# Patient Record
Sex: MALE | Race: WHITE | NOT HISPANIC OR LATINO | Employment: UNEMPLOYED | URBAN - METROPOLITAN AREA
[De-identification: names, ages, dates, MRNs, and addresses within clinical notes are randomized per-mention and may not be internally consistent; named-entity substitution may affect disease eponyms.]

---

## 2018-01-01 ENCOUNTER — APPOINTMENT (OUTPATIENT)
Dept: RADIOLOGY | Facility: MEDICAL CENTER | Age: 48
DRG: 288 | End: 2018-01-01
Attending: EMERGENCY MEDICINE

## 2018-01-01 ENCOUNTER — APPOINTMENT (OUTPATIENT)
Dept: CARDIOLOGY | Facility: MEDICAL CENTER | Age: 48
DRG: 288 | End: 2018-01-01
Attending: HOSPITALIST

## 2018-01-01 ENCOUNTER — HOSPITAL ENCOUNTER (INPATIENT)
Facility: MEDICAL CENTER | Age: 48
LOS: 6 days | DRG: 288 | End: 2018-12-17
Attending: EMERGENCY MEDICINE | Admitting: INTERNAL MEDICINE

## 2018-01-01 ENCOUNTER — APPOINTMENT (OUTPATIENT)
Dept: RADIOLOGY | Facility: MEDICAL CENTER | Age: 48
DRG: 288 | End: 2018-01-01
Attending: HOSPITALIST

## 2018-01-01 VITALS
SYSTOLIC BLOOD PRESSURE: 159 MMHG | TEMPERATURE: 98.7 F | BODY MASS INDEX: 29.48 KG/M2 | DIASTOLIC BLOOD PRESSURE: 87 MMHG | RESPIRATION RATE: 18 BRPM | OXYGEN SATURATION: 95 % | WEIGHT: 183.42 LBS | HEIGHT: 66 IN | HEART RATE: 106 BPM

## 2018-01-01 DIAGNOSIS — M54.42 ACUTE LEFT-SIDED LOW BACK PAIN WITH LEFT-SIDED SCIATICA: ICD-10-CM

## 2018-01-01 DIAGNOSIS — I95.9 HYPOTENSION, UNSPECIFIED HYPOTENSION TYPE: ICD-10-CM

## 2018-01-01 LAB
ALBUMIN SERPL BCP-MCNC: 3.1 G/DL (ref 3.2–4.9)
ALBUMIN SERPL BCP-MCNC: 3.4 G/DL (ref 3.2–4.9)
ALBUMIN SERPL BCP-MCNC: 3.5 G/DL (ref 3.2–4.9)
ALBUMIN/GLOB SERPL: 1.3 G/DL
ALBUMIN/GLOB SERPL: 1.4 G/DL
ALBUMIN/GLOB SERPL: 1.5 G/DL
ALP SERPL-CCNC: 55 U/L (ref 30–99)
ALP SERPL-CCNC: 58 U/L (ref 30–99)
ALP SERPL-CCNC: 59 U/L (ref 30–99)
ALT SERPL-CCNC: 32 U/L (ref 2–50)
ALT SERPL-CCNC: 39 U/L (ref 2–50)
ALT SERPL-CCNC: 53 U/L (ref 2–50)
AMPHET UR QL SCN: POSITIVE
ANION GAP SERPL CALC-SCNC: 10 MMOL/L (ref 0–11.9)
ANION GAP SERPL CALC-SCNC: 11 MMOL/L (ref 0–11.9)
ANION GAP SERPL CALC-SCNC: 7 MMOL/L (ref 0–11.9)
ANION GAP SERPL CALC-SCNC: 8 MMOL/L (ref 0–11.9)
ANION GAP SERPL CALC-SCNC: 9 MMOL/L (ref 0–11.9)
APPEARANCE UR: CLEAR
APPEARANCE UR: CLEAR
APTT PPP: 29.8 SEC (ref 24.7–36)
AST SERPL-CCNC: 17 U/L (ref 12–45)
AST SERPL-CCNC: 34 U/L (ref 12–45)
AST SERPL-CCNC: 39 U/L (ref 12–45)
BACTERIA BLD CULT: ABNORMAL
BACTERIA BLD CULT: ABNORMAL
BACTERIA BLD CULT: NORMAL
BARBITURATES UR QL SCN: NEGATIVE
BASOPHILS # BLD AUTO: 0 % (ref 0–1.8)
BASOPHILS # BLD AUTO: 0.4 % (ref 0–1.8)
BASOPHILS # BLD AUTO: 0.5 % (ref 0–1.8)
BASOPHILS # BLD AUTO: 0.5 % (ref 0–1.8)
BASOPHILS # BLD AUTO: 0.7 % (ref 0–1.8)
BASOPHILS # BLD AUTO: 0.8 % (ref 0–1.8)
BASOPHILS # BLD: 0 K/UL (ref 0–0.12)
BASOPHILS # BLD: 0.08 K/UL (ref 0–0.12)
BASOPHILS # BLD: 0.09 K/UL (ref 0–0.12)
BASOPHILS # BLD: 0.09 K/UL (ref 0–0.12)
BASOPHILS # BLD: 0.1 K/UL (ref 0–0.12)
BASOPHILS # BLD: 0.12 K/UL (ref 0–0.12)
BENZODIAZ UR QL SCN: NEGATIVE
BILIRUB SERPL-MCNC: 0.4 MG/DL (ref 0.1–1.5)
BILIRUB SERPL-MCNC: 0.9 MG/DL (ref 0.1–1.5)
BILIRUB SERPL-MCNC: 1 MG/DL (ref 0.1–1.5)
BILIRUB UR QL STRIP.AUTO: NEGATIVE
BILIRUB UR QL STRIP.AUTO: NEGATIVE
BUN SERPL-MCNC: 11 MG/DL (ref 8–22)
BUN SERPL-MCNC: 15 MG/DL (ref 8–22)
BUN SERPL-MCNC: 18 MG/DL (ref 8–22)
BUN SERPL-MCNC: 22 MG/DL (ref 8–22)
BUN SERPL-MCNC: 26 MG/DL (ref 8–22)
BUN SERPL-MCNC: 8 MG/DL (ref 8–22)
BUN SERPL-MCNC: 9 MG/DL (ref 8–22)
BZE UR QL SCN: NEGATIVE
CALCIUM SERPL-MCNC: 7.9 MG/DL (ref 8.5–10.5)
CALCIUM SERPL-MCNC: 8.1 MG/DL (ref 8.5–10.5)
CALCIUM SERPL-MCNC: 8.4 MG/DL (ref 8.5–10.5)
CALCIUM SERPL-MCNC: 8.4 MG/DL (ref 8.5–10.5)
CALCIUM SERPL-MCNC: 8.5 MG/DL (ref 8.5–10.5)
CALCIUM SERPL-MCNC: 8.6 MG/DL (ref 8.5–10.5)
CALCIUM SERPL-MCNC: 8.6 MG/DL (ref 8.5–10.5)
CANNABINOIDS UR QL SCN: POSITIVE
CHLORIDE SERPL-SCNC: 103 MMOL/L (ref 96–112)
CHLORIDE SERPL-SCNC: 105 MMOL/L (ref 96–112)
CHLORIDE SERPL-SCNC: 105 MMOL/L (ref 96–112)
CHLORIDE SERPL-SCNC: 106 MMOL/L (ref 96–112)
CHLORIDE SERPL-SCNC: 109 MMOL/L (ref 96–112)
CHLORIDE SERPL-SCNC: 110 MMOL/L (ref 96–112)
CHLORIDE SERPL-SCNC: 110 MMOL/L (ref 96–112)
CO2 SERPL-SCNC: 18 MMOL/L (ref 20–33)
CO2 SERPL-SCNC: 19 MMOL/L (ref 20–33)
CO2 SERPL-SCNC: 19 MMOL/L (ref 20–33)
CO2 SERPL-SCNC: 20 MMOL/L (ref 20–33)
CO2 SERPL-SCNC: 21 MMOL/L (ref 20–33)
CO2 SERPL-SCNC: 22 MMOL/L (ref 20–33)
CO2 SERPL-SCNC: 22 MMOL/L (ref 20–33)
COLOR UR: YELLOW
COLOR UR: YELLOW
CREAT SERPL-MCNC: 0.72 MG/DL (ref 0.5–1.4)
CREAT SERPL-MCNC: 0.73 MG/DL (ref 0.5–1.4)
CREAT SERPL-MCNC: 0.74 MG/DL (ref 0.5–1.4)
CREAT SERPL-MCNC: 0.77 MG/DL (ref 0.5–1.4)
CREAT SERPL-MCNC: 0.81 MG/DL (ref 0.5–1.4)
CREAT SERPL-MCNC: 1.21 MG/DL (ref 0.5–1.4)
CREAT SERPL-MCNC: 1.44 MG/DL (ref 0.5–1.4)
CRP SERPL HS-MCNC: 0.27 MG/DL (ref 0–0.75)
EKG IMPRESSION: NORMAL
EOSINOPHIL # BLD AUTO: 0 K/UL (ref 0–0.51)
EOSINOPHIL # BLD AUTO: 0.07 K/UL (ref 0–0.51)
EOSINOPHIL # BLD AUTO: 0.26 K/UL (ref 0–0.51)
EOSINOPHIL # BLD AUTO: 0.37 K/UL (ref 0–0.51)
EOSINOPHIL # BLD AUTO: 0.5 K/UL (ref 0–0.51)
EOSINOPHIL # BLD AUTO: 0.72 K/UL (ref 0–0.51)
EOSINOPHIL NFR BLD: 0 % (ref 0–6.9)
EOSINOPHIL NFR BLD: 0.4 % (ref 0–6.9)
EOSINOPHIL NFR BLD: 1.4 % (ref 0–6.9)
EOSINOPHIL NFR BLD: 2.6 % (ref 0–6.9)
EOSINOPHIL NFR BLD: 2.8 % (ref 0–6.9)
EOSINOPHIL NFR BLD: 4.1 % (ref 0–6.9)
ERYTHROCYTE [DISTWIDTH] IN BLOOD BY AUTOMATED COUNT: 41.4 FL (ref 35.9–50)
ERYTHROCYTE [DISTWIDTH] IN BLOOD BY AUTOMATED COUNT: 42.4 FL (ref 35.9–50)
ERYTHROCYTE [DISTWIDTH] IN BLOOD BY AUTOMATED COUNT: 42.6 FL (ref 35.9–50)
ERYTHROCYTE [DISTWIDTH] IN BLOOD BY AUTOMATED COUNT: 42.9 FL (ref 35.9–50)
ERYTHROCYTE [DISTWIDTH] IN BLOOD BY AUTOMATED COUNT: 43.4 FL (ref 35.9–50)
ERYTHROCYTE [DISTWIDTH] IN BLOOD BY AUTOMATED COUNT: 43.6 FL (ref 35.9–50)
ERYTHROCYTE [DISTWIDTH] IN BLOOD BY AUTOMATED COUNT: 45.9 FL (ref 35.9–50)
ERYTHROCYTE [SEDIMENTATION RATE] IN BLOOD BY WESTERGREN METHOD: 6 MM/HOUR (ref 0–15)
GIANT PLATELETS BLD QL SMEAR: NORMAL
GLOBULIN SER CALC-MCNC: 2.3 G/DL (ref 1.9–3.5)
GLOBULIN SER CALC-MCNC: 2.3 G/DL (ref 1.9–3.5)
GLOBULIN SER CALC-MCNC: 2.5 G/DL (ref 1.9–3.5)
GLUCOSE BLD-MCNC: 83 MG/DL (ref 65–99)
GLUCOSE SERPL-MCNC: 103 MG/DL (ref 65–99)
GLUCOSE SERPL-MCNC: 104 MG/DL (ref 65–99)
GLUCOSE SERPL-MCNC: 110 MG/DL (ref 65–99)
GLUCOSE SERPL-MCNC: 124 MG/DL (ref 65–99)
GLUCOSE SERPL-MCNC: 134 MG/DL (ref 65–99)
GLUCOSE SERPL-MCNC: 155 MG/DL (ref 65–99)
GLUCOSE SERPL-MCNC: 97 MG/DL (ref 65–99)
GLUCOSE UR STRIP.AUTO-MCNC: NEGATIVE MG/DL
GLUCOSE UR STRIP.AUTO-MCNC: NEGATIVE MG/DL
HAV IGM SERPL QL IA: NEGATIVE
HBV CORE IGM SER QL: NEGATIVE
HBV SURFACE AG SER QL: NEGATIVE
HCT VFR BLD AUTO: 37.3 % (ref 42–52)
HCT VFR BLD AUTO: 38.9 % (ref 42–52)
HCT VFR BLD AUTO: 40 % (ref 42–52)
HCT VFR BLD AUTO: 40.9 % (ref 42–52)
HCT VFR BLD AUTO: 41 % (ref 42–52)
HCT VFR BLD AUTO: 41.6 % (ref 42–52)
HCT VFR BLD AUTO: 41.8 % (ref 42–52)
HCT VFR BLD AUTO: 42.5 % (ref 42–52)
HCV AB SER QL: NEGATIVE
HGB BLD-MCNC: 12.1 G/DL (ref 14–18)
HGB BLD-MCNC: 13.1 G/DL (ref 14–18)
HGB BLD-MCNC: 13.9 G/DL (ref 14–18)
HGB BLD-MCNC: 14 G/DL (ref 14–18)
HGB BLD-MCNC: 14 G/DL (ref 14–18)
HGB BLD-MCNC: 14.1 G/DL (ref 14–18)
HGB BLD-MCNC: 14.2 G/DL (ref 14–18)
HGB BLD-MCNC: 14.3 G/DL (ref 14–18)
HIV 1+2 AB+HIV1 P24 AG SERPL QL IA: NON REACTIVE
IMM GRANULOCYTES # BLD AUTO: 0.1 K/UL (ref 0–0.11)
IMM GRANULOCYTES # BLD AUTO: 0.2 K/UL (ref 0–0.11)
IMM GRANULOCYTES # BLD AUTO: 0.2 K/UL (ref 0–0.11)
IMM GRANULOCYTES # BLD AUTO: 0.23 K/UL (ref 0–0.11)
IMM GRANULOCYTES # BLD AUTO: 0.25 K/UL (ref 0–0.11)
IMM GRANULOCYTES NFR BLD AUTO: 0.8 % (ref 0–0.9)
IMM GRANULOCYTES NFR BLD AUTO: 1 % (ref 0–0.9)
IMM GRANULOCYTES NFR BLD AUTO: 1 % (ref 0–0.9)
IMM GRANULOCYTES NFR BLD AUTO: 1.2 % (ref 0–0.9)
IMM GRANULOCYTES NFR BLD AUTO: 1.4 % (ref 0–0.9)
INR PPP: 1.07 (ref 0.87–1.13)
KETONES UR STRIP.AUTO-MCNC: 40 MG/DL
KETONES UR STRIP.AUTO-MCNC: NEGATIVE MG/DL
LACTATE BLD-SCNC: 1.7 MMOL/L (ref 0.5–2)
LEUKOCYTE ESTERASE UR QL STRIP.AUTO: NEGATIVE
LEUKOCYTE ESTERASE UR QL STRIP.AUTO: NEGATIVE
LG PLATELETS BLD QL SMEAR: NORMAL
LIPASE SERPL-CCNC: 12 U/L (ref 11–82)
LV EJECT FRACT  99904: 65
LV EJECT FRACT MOD 2C 99903: 71.92
LV EJECT FRACT MOD 4C 99902: 70.61
LV EJECT FRACT MOD BP 99901: 69.53
LYMPHOCYTES # BLD AUTO: 1.76 K/UL (ref 1–4.8)
LYMPHOCYTES # BLD AUTO: 1.8 K/UL (ref 1–4.8)
LYMPHOCYTES # BLD AUTO: 2 K/UL (ref 1–4.8)
LYMPHOCYTES # BLD AUTO: 2.18 K/UL (ref 1–4.8)
LYMPHOCYTES # BLD AUTO: 2.48 K/UL (ref 1–4.8)
LYMPHOCYTES # BLD AUTO: 3.29 K/UL (ref 1–4.8)
LYMPHOCYTES NFR BLD: 10.5 % (ref 22–41)
LYMPHOCYTES NFR BLD: 11 % (ref 22–41)
LYMPHOCYTES NFR BLD: 12.5 % (ref 22–41)
LYMPHOCYTES NFR BLD: 25 % (ref 22–41)
LYMPHOCYTES NFR BLD: 9.2 % (ref 22–41)
LYMPHOCYTES NFR BLD: 9.4 % (ref 22–41)
MAGNESIUM SERPL-MCNC: 2 MG/DL (ref 1.5–2.5)
MANUAL DIFF BLD: NORMAL
MCH RBC QN AUTO: 28.9 PG (ref 27–33)
MCH RBC QN AUTO: 29.1 PG (ref 27–33)
MCH RBC QN AUTO: 29.2 PG (ref 27–33)
MCH RBC QN AUTO: 29.4 PG (ref 27–33)
MCH RBC QN AUTO: 29.5 PG (ref 27–33)
MCH RBC QN AUTO: 29.7 PG (ref 27–33)
MCH RBC QN AUTO: 29.7 PG (ref 27–33)
MCHC RBC AUTO-ENTMCNC: 32.4 G/DL (ref 33.7–35.3)
MCHC RBC AUTO-ENTMCNC: 33.6 G/DL (ref 33.7–35.3)
MCHC RBC AUTO-ENTMCNC: 33.7 G/DL (ref 33.7–35.3)
MCHC RBC AUTO-ENTMCNC: 33.7 G/DL (ref 33.7–35.3)
MCHC RBC AUTO-ENTMCNC: 34 G/DL (ref 33.7–35.3)
MCHC RBC AUTO-ENTMCNC: 34.1 G/DL (ref 33.7–35.3)
MCHC RBC AUTO-ENTMCNC: 35.3 G/DL (ref 33.7–35.3)
MCV RBC AUTO: 84.4 FL (ref 81.4–97.8)
MCV RBC AUTO: 85.6 FL (ref 81.4–97.8)
MCV RBC AUTO: 85.7 FL (ref 81.4–97.8)
MCV RBC AUTO: 85.9 FL (ref 81.4–97.8)
MCV RBC AUTO: 87.4 FL (ref 81.4–97.8)
MCV RBC AUTO: 88.1 FL (ref 81.4–97.8)
MCV RBC AUTO: 91 FL (ref 81.4–97.8)
METHADONE UR QL SCN: NEGATIVE
MICRO URNS: ABNORMAL
MICRO URNS: NORMAL
MONOCYTES # BLD AUTO: 1.12 K/UL (ref 0–0.85)
MONOCYTES # BLD AUTO: 1.98 K/UL (ref 0–0.85)
MONOCYTES # BLD AUTO: 2 K/UL (ref 0–0.85)
MONOCYTES # BLD AUTO: 2.01 K/UL (ref 0–0.85)
MONOCYTES # BLD AUTO: 2.04 K/UL (ref 0–0.85)
MONOCYTES # BLD AUTO: 2.25 K/UL (ref 0–0.85)
MONOCYTES NFR BLD AUTO: 10 % (ref 0–13.4)
MONOCYTES NFR BLD AUTO: 10.4 % (ref 0–13.4)
MONOCYTES NFR BLD AUTO: 10.4 % (ref 0–13.4)
MONOCYTES NFR BLD AUTO: 10.5 % (ref 0–13.4)
MONOCYTES NFR BLD AUTO: 11.7 % (ref 0–13.4)
MONOCYTES NFR BLD AUTO: 8.5 % (ref 0–13.4)
MORPHOLOGY BLD-IMP: NORMAL
NEUTROPHILS # BLD AUTO: 12.08 K/UL (ref 1.82–7.42)
NEUTROPHILS # BLD AUTO: 14.59 K/UL (ref 1.82–7.42)
NEUTROPHILS # BLD AUTO: 14.67 K/UL (ref 1.82–7.42)
NEUTROPHILS # BLD AUTO: 14.77 K/UL (ref 1.82–7.42)
NEUTROPHILS # BLD AUTO: 17.78 K/UL (ref 1.82–7.42)
NEUTROPHILS # BLD AUTO: 8.19 K/UL (ref 1.82–7.42)
NEUTROPHILS NFR BLD: 62.1 % (ref 44–72)
NEUTROPHILS NFR BLD: 69.6 % (ref 44–72)
NEUTROPHILS NFR BLD: 74 % (ref 44–72)
NEUTROPHILS NFR BLD: 76.3 % (ref 44–72)
NEUTROPHILS NFR BLD: 77.1 % (ref 44–72)
NEUTROPHILS NFR BLD: 77.2 % (ref 44–72)
NEUTS BAND NFR BLD MANUAL: 5 % (ref 0–10)
NITRITE UR QL STRIP.AUTO: NEGATIVE
NITRITE UR QL STRIP.AUTO: NEGATIVE
NRBC # BLD AUTO: 0 K/UL
NRBC BLD-RTO: 0 /100 WBC
OPIATES UR QL SCN: POSITIVE
OXYCODONE UR QL SCN: NEGATIVE
PCP UR QL SCN: NEGATIVE
PH UR STRIP.AUTO: 5.5 [PH]
PH UR STRIP.AUTO: 6.5 [PH]
PLATELET # BLD AUTO: 175 K/UL (ref 164–446)
PLATELET # BLD AUTO: 177 K/UL (ref 164–446)
PLATELET # BLD AUTO: 196 K/UL (ref 164–446)
PLATELET # BLD AUTO: 233 K/UL (ref 164–446)
PLATELET # BLD AUTO: 244 K/UL (ref 164–446)
PLATELET # BLD AUTO: 319 K/UL (ref 164–446)
PLATELET # BLD AUTO: 364 K/UL (ref 164–446)
PLATELET BLD QL SMEAR: NORMAL
PMV BLD AUTO: 10.5 FL (ref 9–12.9)
PMV BLD AUTO: 9.4 FL (ref 9–12.9)
PMV BLD AUTO: 9.4 FL (ref 9–12.9)
PMV BLD AUTO: 9.5 FL (ref 9–12.9)
PMV BLD AUTO: 9.8 FL (ref 9–12.9)
PMV BLD AUTO: 9.8 FL (ref 9–12.9)
PMV BLD AUTO: 9.9 FL (ref 9–12.9)
POTASSIUM SERPL-SCNC: 3.2 MMOL/L (ref 3.6–5.5)
POTASSIUM SERPL-SCNC: 3.3 MMOL/L (ref 3.6–5.5)
POTASSIUM SERPL-SCNC: 3.4 MMOL/L (ref 3.6–5.5)
POTASSIUM SERPL-SCNC: 3.5 MMOL/L (ref 3.6–5.5)
POTASSIUM SERPL-SCNC: 3.7 MMOL/L (ref 3.6–5.5)
POTASSIUM SERPL-SCNC: 3.8 MMOL/L (ref 3.6–5.5)
POTASSIUM SERPL-SCNC: 4 MMOL/L (ref 3.6–5.5)
PROCALCITONIN SERPL-MCNC: 0.14 NG/ML
PROPOXYPH UR QL SCN: NEGATIVE
PROT SERPL-MCNC: 5.4 G/DL (ref 6–8.2)
PROT SERPL-MCNC: 5.8 G/DL (ref 6–8.2)
PROT SERPL-MCNC: 5.9 G/DL (ref 6–8.2)
PROT UR QL STRIP: NEGATIVE MG/DL
PROT UR QL STRIP: NEGATIVE MG/DL
PROTHROMBIN TIME: 14 SEC (ref 12–14.6)
RBC # BLD AUTO: 4.1 M/UL (ref 4.7–6.1)
RBC # BLD AUTO: 4.45 M/UL (ref 4.7–6.1)
RBC # BLD AUTO: 4.72 M/UL (ref 4.7–6.1)
RBC # BLD AUTO: 4.74 M/UL (ref 4.7–6.1)
RBC # BLD AUTO: 4.79 M/UL (ref 4.7–6.1)
RBC # BLD AUTO: 4.88 M/UL (ref 4.7–6.1)
RBC # BLD AUTO: 4.95 M/UL (ref 4.7–6.1)
RBC BLD AUTO: NORMAL
RBC UR QL AUTO: NEGATIVE
RBC UR QL AUTO: NEGATIVE
SIGNIFICANT IND 70042: ABNORMAL
SIGNIFICANT IND 70042: NORMAL
SITE SITE: ABNORMAL
SITE SITE: NORMAL
SODIUM SERPL-SCNC: 134 MMOL/L (ref 135–145)
SODIUM SERPL-SCNC: 135 MMOL/L (ref 135–145)
SODIUM SERPL-SCNC: 136 MMOL/L (ref 135–145)
SODIUM SERPL-SCNC: 137 MMOL/L (ref 135–145)
SODIUM SERPL-SCNC: 140 MMOL/L (ref 135–145)
SOURCE SOURCE: ABNORMAL
SOURCE SOURCE: NORMAL
SP GR UR STRIP.AUTO: 1.01
SP GR UR STRIP.AUTO: >=1.03
TROPONIN I SERPL-MCNC: 0.33 NG/ML (ref 0–0.04)
TROPONIN I SERPL-MCNC: <0.01 NG/ML (ref 0–0.04)
UROBILINOGEN UR STRIP.AUTO-MCNC: 0.2 MG/DL
UROBILINOGEN UR STRIP.AUTO-MCNC: 0.2 MG/DL
VARIANT LYMPHS BLD QL SMEAR: NORMAL
WBC # BLD AUTO: 13.2 K/UL (ref 4.8–10.8)
WBC # BLD AUTO: 17.4 K/UL (ref 4.8–10.8)
WBC # BLD AUTO: 18.1 K/UL (ref 4.8–10.8)
WBC # BLD AUTO: 19 K/UL (ref 4.8–10.8)
WBC # BLD AUTO: 19.1 K/UL (ref 4.8–10.8)
WBC # BLD AUTO: 19.1 K/UL (ref 4.8–10.8)
WBC # BLD AUTO: 22.5 K/UL (ref 4.8–10.8)

## 2018-01-01 PROCEDURE — A9270 NON-COVERED ITEM OR SERVICE: HCPCS | Performed by: INTERNAL MEDICINE

## 2018-01-01 PROCEDURE — A9270 NON-COVERED ITEM OR SERVICE: HCPCS | Performed by: HOSPITALIST

## 2018-01-01 PROCEDURE — 304724

## 2018-01-01 PROCEDURE — 700102 HCHG RX REV CODE 250 W/ 637 OVERRIDE(OP): Performed by: HOSPITALIST

## 2018-01-01 PROCEDURE — G8997 SWALLOW GOAL STATUS: HCPCS | Mod: CH

## 2018-01-01 PROCEDURE — 700111 HCHG RX REV CODE 636 W/ 250 OVERRIDE (IP): Performed by: HOSPITALIST

## 2018-01-01 PROCEDURE — 92950 HEART/LUNG RESUSCITATION CPR: CPT

## 2018-01-01 PROCEDURE — 99232 SBSQ HOSP IP/OBS MODERATE 35: CPT | Performed by: HOSPITALIST

## 2018-01-01 PROCEDURE — 97161 PT EVAL LOW COMPLEX 20 MIN: CPT

## 2018-01-01 PROCEDURE — 700111 HCHG RX REV CODE 636 W/ 250 OVERRIDE (IP): Performed by: INTERNAL MEDICINE

## 2018-01-01 PROCEDURE — 85652 RBC SED RATE AUTOMATED: CPT

## 2018-01-01 PROCEDURE — 92526 ORAL FUNCTION THERAPY: CPT

## 2018-01-01 PROCEDURE — 96374 THER/PROPH/DIAG INJ IV PUSH: CPT

## 2018-01-01 PROCEDURE — 80053 COMPREHEN METABOLIC PANEL: CPT

## 2018-01-01 PROCEDURE — 85610 PROTHROMBIN TIME: CPT

## 2018-01-01 PROCEDURE — 770020 HCHG ROOM/CARE - TELE (206)

## 2018-01-01 PROCEDURE — 87040 BLOOD CULTURE FOR BACTERIA: CPT

## 2018-01-01 PROCEDURE — 700117 HCHG RX CONTRAST REV CODE 255: Performed by: EMERGENCY MEDICINE

## 2018-01-01 PROCEDURE — 36415 COLL VENOUS BLD VENIPUNCTURE: CPT

## 2018-01-01 PROCEDURE — 85025 COMPLETE CBC W/AUTO DIFF WBC: CPT

## 2018-01-01 PROCEDURE — 71045 X-RAY EXAM CHEST 1 VIEW: CPT

## 2018-01-01 PROCEDURE — 90686 IIV4 VACC NO PRSV 0.5 ML IM: CPT | Performed by: HOSPITALIST

## 2018-01-01 PROCEDURE — 700105 HCHG RX REV CODE 258: Performed by: INTERNAL MEDICINE

## 2018-01-01 PROCEDURE — 85027 COMPLETE CBC AUTOMATED: CPT

## 2018-01-01 PROCEDURE — 700111 HCHG RX REV CODE 636 W/ 250 OVERRIDE (IP)

## 2018-01-01 PROCEDURE — 87077 CULTURE AEROBIC IDENTIFY: CPT

## 2018-01-01 PROCEDURE — G8987 SELF CARE CURRENT STATUS: HCPCS | Mod: CI

## 2018-01-01 PROCEDURE — 700102 HCHG RX REV CODE 250 W/ 637 OVERRIDE(OP): Performed by: INTERNAL MEDICINE

## 2018-01-01 PROCEDURE — 94760 N-INVAS EAR/PLS OXIMETRY 1: CPT

## 2018-01-01 PROCEDURE — 90732 PPSV23 VACC 2 YRS+ SUBQ/IM: CPT | Performed by: HOSPITALIST

## 2018-01-01 PROCEDURE — 700105 HCHG RX REV CODE 258: Performed by: HOSPITALIST

## 2018-01-01 PROCEDURE — 93005 ELECTROCARDIOGRAM TRACING: CPT | Performed by: EMERGENCY MEDICINE

## 2018-01-01 PROCEDURE — 99291 CRITICAL CARE FIRST HOUR: CPT | Mod: 25 | Performed by: INTERNAL MEDICINE

## 2018-01-01 PROCEDURE — 93306 TTE W/DOPPLER COMPLETE: CPT

## 2018-01-01 PROCEDURE — 83735 ASSAY OF MAGNESIUM: CPT

## 2018-01-01 PROCEDURE — 99233 SBSQ HOSP IP/OBS HIGH 50: CPT | Performed by: INTERNAL MEDICINE

## 2018-01-01 PROCEDURE — 80048 BASIC METABOLIC PNL TOTAL CA: CPT

## 2018-01-01 PROCEDURE — 85730 THROMBOPLASTIN TIME PARTIAL: CPT

## 2018-01-01 PROCEDURE — 700111 HCHG RX REV CODE 636 W/ 250 OVERRIDE (IP): Performed by: FAMILY MEDICINE

## 2018-01-01 PROCEDURE — 82962 GLUCOSE BLOOD TEST: CPT

## 2018-01-01 PROCEDURE — G8979 MOBILITY GOAL STATUS: HCPCS | Mod: CI

## 2018-01-01 PROCEDURE — G8998 SWALLOW D/C STATUS: HCPCS | Mod: CH

## 2018-01-01 PROCEDURE — 83605 ASSAY OF LACTIC ACID: CPT

## 2018-01-01 PROCEDURE — 84484 ASSAY OF TROPONIN QUANT: CPT

## 2018-01-01 PROCEDURE — 81003 URINALYSIS AUTO W/O SCOPE: CPT

## 2018-01-01 PROCEDURE — 96375 TX/PRO/DX INJ NEW DRUG ADDON: CPT

## 2018-01-01 PROCEDURE — 3E02340 INTRODUCTION OF INFLUENZA VACCINE INTO MUSCLE, PERCUTANEOUS APPROACH: ICD-10-PCS | Performed by: INTERNAL MEDICINE

## 2018-01-01 PROCEDURE — 85018 HEMOGLOBIN: CPT

## 2018-01-01 PROCEDURE — A9270 NON-COVERED ITEM OR SERVICE: HCPCS | Performed by: FAMILY MEDICINE

## 2018-01-01 PROCEDURE — 92610 EVALUATE SWALLOWING FUNCTION: CPT

## 2018-01-01 PROCEDURE — 700102 HCHG RX REV CODE 250 W/ 637 OVERRIDE(OP): Performed by: FAMILY MEDICINE

## 2018-01-01 PROCEDURE — 86140 C-REACTIVE PROTEIN: CPT

## 2018-01-01 PROCEDURE — 93306 TTE W/DOPPLER COMPLETE: CPT | Mod: 26 | Performed by: INTERNAL MEDICINE

## 2018-01-01 PROCEDURE — G8989 SELF CARE D/C STATUS: HCPCS | Mod: CI

## 2018-01-01 PROCEDURE — 87389 HIV-1 AG W/HIV-1&-2 AB AG IA: CPT

## 2018-01-01 PROCEDURE — 83690 ASSAY OF LIPASE: CPT

## 2018-01-01 PROCEDURE — 0BH17EZ INSERTION OF ENDOTRACHEAL AIRWAY INTO TRACHEA, VIA NATURAL OR ARTIFICIAL OPENING: ICD-10-PCS | Performed by: EMERGENCY MEDICINE

## 2018-01-01 PROCEDURE — 85014 HEMATOCRIT: CPT

## 2018-01-01 PROCEDURE — G8980 MOBILITY D/C STATUS: HCPCS | Mod: CI

## 2018-01-01 PROCEDURE — 99255 IP/OBS CONSLTJ NEW/EST HI 80: CPT | Performed by: INTERNAL MEDICINE

## 2018-01-01 PROCEDURE — A9585 GADOBUTROL INJECTION: HCPCS | Performed by: EMERGENCY MEDICINE

## 2018-01-01 PROCEDURE — G8978 MOBILITY CURRENT STATUS: HCPCS | Mod: CI

## 2018-01-01 PROCEDURE — G8996 SWALLOW CURRENT STATUS: HCPCS | Mod: CI

## 2018-01-01 PROCEDURE — G8988 SELF CARE GOAL STATUS: HCPCS | Mod: CI

## 2018-01-01 PROCEDURE — 99239 HOSP IP/OBS DSCHRG MGMT >30: CPT | Performed by: HOSPITALIST

## 2018-01-01 PROCEDURE — 74176 CT ABD & PELVIS W/O CONTRAST: CPT

## 2018-01-01 PROCEDURE — 700111 HCHG RX REV CODE 636 W/ 250 OVERRIDE (IP): Performed by: EMERGENCY MEDICINE

## 2018-01-01 PROCEDURE — 700105 HCHG RX REV CODE 258: Performed by: EMERGENCY MEDICINE

## 2018-01-01 PROCEDURE — 80074 ACUTE HEPATITIS PANEL: CPT

## 2018-01-01 PROCEDURE — 99285 EMERGENCY DEPT VISIT HI MDM: CPT

## 2018-01-01 PROCEDURE — 5A12012 PERFORMANCE OF CARDIAC OUTPUT, SINGLE, MANUAL: ICD-10-PCS | Performed by: EMERGENCY MEDICINE

## 2018-01-01 PROCEDURE — 97165 OT EVAL LOW COMPLEX 30 MIN: CPT

## 2018-01-01 PROCEDURE — 99406 BEHAV CHNG SMOKING 3-10 MIN: CPT | Performed by: INTERNAL MEDICINE

## 2018-01-01 PROCEDURE — 85007 BL SMEAR W/DIFF WBC COUNT: CPT

## 2018-01-01 PROCEDURE — 71250 CT THORAX DX C-: CPT

## 2018-01-01 PROCEDURE — 80307 DRUG TEST PRSMV CHEM ANLYZR: CPT

## 2018-01-01 PROCEDURE — 90471 IMMUNIZATION ADMIN: CPT

## 2018-01-01 PROCEDURE — 72158 MRI LUMBAR SPINE W/O & W/DYE: CPT

## 2018-01-01 PROCEDURE — 51798 US URINE CAPACITY MEASURE: CPT

## 2018-01-01 PROCEDURE — 3E0234Z INTRODUCTION OF SERUM, TOXOID AND VACCINE INTO MUSCLE, PERCUTANEOUS APPROACH: ICD-10-PCS | Performed by: INTERNAL MEDICINE

## 2018-01-01 PROCEDURE — 84145 PROCALCITONIN (PCT): CPT

## 2018-01-01 RX ORDER — PROMETHAZINE HYDROCHLORIDE 25 MG/1
12.5-25 TABLET ORAL EVERY 4 HOURS PRN
Status: DISCONTINUED | OUTPATIENT
Start: 2018-01-01 | End: 2018-01-01 | Stop reason: HOSPADM

## 2018-01-01 RX ORDER — ONDANSETRON 2 MG/ML
4 INJECTION INTRAMUSCULAR; INTRAVENOUS ONCE
Status: COMPLETED | OUTPATIENT
Start: 2018-01-01 | End: 2018-01-01

## 2018-01-01 RX ORDER — POTASSIUM CHLORIDE 20 MEQ/1
20 TABLET, EXTENDED RELEASE ORAL ONCE
Status: COMPLETED | OUTPATIENT
Start: 2018-01-01 | End: 2018-01-01

## 2018-01-01 RX ORDER — ONDANSETRON 2 MG/ML
4 INJECTION INTRAMUSCULAR; INTRAVENOUS EVERY 4 HOURS PRN
Status: DISCONTINUED | OUTPATIENT
Start: 2018-01-01 | End: 2018-01-01 | Stop reason: HOSPADM

## 2018-01-01 RX ORDER — ONDANSETRON 2 MG/ML
INJECTION INTRAMUSCULAR; INTRAVENOUS
Status: COMPLETED
Start: 2018-01-01 | End: 2018-01-01

## 2018-01-01 RX ORDER — POTASSIUM CHLORIDE 20 MEQ/1
60 TABLET, EXTENDED RELEASE ORAL ONCE
Status: COMPLETED | OUTPATIENT
Start: 2018-01-01 | End: 2018-01-01

## 2018-01-01 RX ORDER — KETOROLAC TROMETHAMINE 30 MG/ML
15 INJECTION, SOLUTION INTRAMUSCULAR; INTRAVENOUS EVERY 6 HOURS PRN
Status: DISCONTINUED | OUTPATIENT
Start: 2018-01-01 | End: 2018-01-01

## 2018-01-01 RX ORDER — ECHINACEA PURPUREA EXTRACT 125 MG
2 TABLET ORAL
Status: DISCONTINUED | OUTPATIENT
Start: 2018-01-01 | End: 2018-01-01 | Stop reason: HOSPADM

## 2018-01-01 RX ORDER — HEPARIN SODIUM 5000 [USP'U]/ML
5000 INJECTION, SOLUTION INTRAVENOUS; SUBCUTANEOUS EVERY 8 HOURS
Status: DISCONTINUED | OUTPATIENT
Start: 2018-01-01 | End: 2018-01-01 | Stop reason: HOSPADM

## 2018-01-01 RX ORDER — HYDRALAZINE HYDROCHLORIDE 20 MG/ML
10-20 INJECTION INTRAMUSCULAR; INTRAVENOUS EVERY 4 HOURS PRN
Status: DISCONTINUED | OUTPATIENT
Start: 2018-01-01 | End: 2018-01-01 | Stop reason: HOSPADM

## 2018-01-01 RX ORDER — BISACODYL 10 MG
10 SUPPOSITORY, RECTAL RECTAL
Status: DISCONTINUED | OUTPATIENT
Start: 2018-01-01 | End: 2018-01-01 | Stop reason: HOSPADM

## 2018-01-01 RX ORDER — CYCLOBENZAPRINE HCL 10 MG
10 TABLET ORAL 3 TIMES DAILY PRN
Status: DISCONTINUED | OUTPATIENT
Start: 2018-01-01 | End: 2018-01-01 | Stop reason: HOSPADM

## 2018-01-01 RX ORDER — BISACODYL 10 MG
10 SUPPOSITORY, RECTAL RECTAL DAILY
Status: DISCONTINUED | OUTPATIENT
Start: 2018-01-01 | End: 2018-01-01 | Stop reason: HOSPADM

## 2018-01-01 RX ORDER — EPINEPHRINE 1 MG/ML(1)
AMPUL (ML) INJECTION
Status: COMPLETED | OUTPATIENT
Start: 2018-01-01 | End: 2018-01-01

## 2018-01-01 RX ORDER — CLONIDINE HYDROCHLORIDE 0.1 MG/1
0.2 TABLET ORAL ONCE
Status: COMPLETED | OUTPATIENT
Start: 2018-01-01 | End: 2018-01-01

## 2018-01-01 RX ORDER — POLYETHYLENE GLYCOL 3350 17 G/17G
1 POWDER, FOR SOLUTION ORAL
Status: DISCONTINUED | OUTPATIENT
Start: 2018-01-01 | End: 2018-01-01 | Stop reason: HOSPADM

## 2018-01-01 RX ORDER — SODIUM CHLORIDE 9 MG/ML
1000 INJECTION, SOLUTION INTRAVENOUS ONCE
Status: COMPLETED | OUTPATIENT
Start: 2018-01-01 | End: 2018-01-01

## 2018-01-01 RX ORDER — POTASSIUM CHLORIDE 20 MEQ/1
40 TABLET, EXTENDED RELEASE ORAL ONCE
Status: COMPLETED | OUTPATIENT
Start: 2018-01-01 | End: 2018-01-01

## 2018-01-01 RX ORDER — AMOXICILLIN 250 MG
2 CAPSULE ORAL 2 TIMES DAILY
Status: DISCONTINUED | OUTPATIENT
Start: 2018-01-01 | End: 2018-01-01 | Stop reason: HOSPADM

## 2018-01-01 RX ORDER — ONDANSETRON 4 MG/1
4 TABLET, ORALLY DISINTEGRATING ORAL EVERY 4 HOURS PRN
Status: DISCONTINUED | OUTPATIENT
Start: 2018-01-01 | End: 2018-01-01 | Stop reason: HOSPADM

## 2018-01-01 RX ORDER — LORAZEPAM 2 MG/ML
1 INJECTION INTRAMUSCULAR ONCE
Status: COMPLETED | OUTPATIENT
Start: 2018-01-01 | End: 2018-01-01

## 2018-01-01 RX ORDER — PROMETHAZINE HYDROCHLORIDE 25 MG/1
12.5-25 SUPPOSITORY RECTAL EVERY 4 HOURS PRN
Status: DISCONTINUED | OUTPATIENT
Start: 2018-01-01 | End: 2018-01-01 | Stop reason: HOSPADM

## 2018-01-01 RX ORDER — MORPHINE SULFATE 4 MG/ML
4 INJECTION, SOLUTION INTRAMUSCULAR; INTRAVENOUS ONCE
Status: COMPLETED | OUTPATIENT
Start: 2018-01-01 | End: 2018-01-01

## 2018-01-01 RX ORDER — KETOROLAC TROMETHAMINE 30 MG/ML
15 INJECTION, SOLUTION INTRAMUSCULAR; INTRAVENOUS ONCE
Status: COMPLETED | OUTPATIENT
Start: 2018-01-01 | End: 2018-01-01

## 2018-01-01 RX ORDER — TRAMADOL HYDROCHLORIDE 50 MG/1
50 TABLET ORAL EVERY 6 HOURS PRN
Status: DISCONTINUED | OUTPATIENT
Start: 2018-01-01 | End: 2018-01-01 | Stop reason: HOSPADM

## 2018-01-01 RX ORDER — MORPHINE SULFATE 4 MG/ML
INJECTION, SOLUTION INTRAMUSCULAR; INTRAVENOUS
Status: COMPLETED
Start: 2018-01-01 | End: 2018-01-01

## 2018-01-01 RX ORDER — ENALAPRILAT 1.25 MG/ML
1.25 INJECTION INTRAVENOUS EVERY 6 HOURS PRN
Status: DISCONTINUED | OUTPATIENT
Start: 2018-01-01 | End: 2018-01-01 | Stop reason: HOSPADM

## 2018-01-01 RX ORDER — ACETAMINOPHEN 325 MG/1
650 TABLET ORAL EVERY 6 HOURS PRN
Status: DISCONTINUED | OUTPATIENT
Start: 2018-01-01 | End: 2018-01-01 | Stop reason: HOSPADM

## 2018-01-01 RX ORDER — MORPHINE SULFATE 10 MG/ML
4 INJECTION, SOLUTION INTRAMUSCULAR; INTRAVENOUS ONCE
Status: DISCONTINUED | OUTPATIENT
Start: 2018-01-01 | End: 2018-01-01

## 2018-01-01 RX ORDER — NICOTINE 21 MG/24HR
21 PATCH, TRANSDERMAL 24 HOURS TRANSDERMAL
Status: DISCONTINUED | OUTPATIENT
Start: 2018-01-01 | End: 2018-01-01 | Stop reason: HOSPADM

## 2018-01-01 RX ORDER — SODIUM CHLORIDE 9 MG/ML
INJECTION, SOLUTION INTRAVENOUS CONTINUOUS
Status: DISCONTINUED | OUTPATIENT
Start: 2018-01-01 | End: 2018-01-01

## 2018-01-01 RX ORDER — GADOBUTROL 604.72 MG/ML
8 INJECTION INTRAVENOUS ONCE
Status: COMPLETED | OUTPATIENT
Start: 2018-01-01 | End: 2018-01-01

## 2018-01-01 RX ORDER — KETOROLAC TROMETHAMINE 30 MG/ML
30 INJECTION, SOLUTION INTRAMUSCULAR; INTRAVENOUS ONCE
Status: COMPLETED | OUTPATIENT
Start: 2018-01-01 | End: 2018-01-01

## 2018-01-01 RX ADMIN — TRAMADOL HYDROCHLORIDE 50 MG: 50 TABLET, FILM COATED ORAL at 06:13

## 2018-01-01 RX ADMIN — HYDRALAZINE HYDROCHLORIDE 20 MG: 20 INJECTION INTRAMUSCULAR; INTRAVENOUS at 00:26

## 2018-01-01 RX ADMIN — STANDARDIZED SENNA CONCENTRATE AND DOCUSATE SODIUM 2 TABLET: 8.6; 5 TABLET, FILM COATED ORAL at 05:24

## 2018-01-01 RX ADMIN — HEPARIN SODIUM 5000 UNITS: 5000 INJECTION, SOLUTION INTRAVENOUS; SUBCUTANEOUS at 21:08

## 2018-01-01 RX ADMIN — HYDRALAZINE HYDROCHLORIDE 20 MG: 20 INJECTION INTRAMUSCULAR; INTRAVENOUS at 00:35

## 2018-01-01 RX ADMIN — KETOROLAC TROMETHAMINE 15 MG: 30 INJECTION, SOLUTION INTRAMUSCULAR at 11:15

## 2018-01-01 RX ADMIN — HYDRALAZINE HYDROCHLORIDE 20 MG: 20 INJECTION INTRAMUSCULAR; INTRAVENOUS at 23:36

## 2018-01-01 RX ADMIN — CEFTRIAXONE SODIUM 2 G: 2 INJECTION, POWDER, FOR SOLUTION INTRAMUSCULAR; INTRAVENOUS at 13:54

## 2018-01-01 RX ADMIN — POTASSIUM CHLORIDE 60 MEQ: 1500 TABLET, EXTENDED RELEASE ORAL at 10:23

## 2018-01-01 RX ADMIN — NICOTINE 21 MG: 21 PATCH, EXTENDED RELEASE TRANSDERMAL at 06:12

## 2018-01-01 RX ADMIN — STANDARDIZED SENNA CONCENTRATE AND DOCUSATE SODIUM 2 TABLET: 8.6; 5 TABLET, FILM COATED ORAL at 06:12

## 2018-01-01 RX ADMIN — MORPHINE SULFATE 4 MG: 4 INJECTION INTRAVENOUS at 11:15

## 2018-01-01 RX ADMIN — STANDARDIZED SENNA CONCENTRATE AND DOCUSATE SODIUM 2 TABLET: 8.6; 5 TABLET, FILM COATED ORAL at 04:54

## 2018-01-01 RX ADMIN — KETOROLAC TROMETHAMINE 15 MG: 30 INJECTION, SOLUTION INTRAMUSCULAR at 12:12

## 2018-01-01 RX ADMIN — PROCHLORPERAZINE EDISYLATE 10 MG: 5 INJECTION INTRAMUSCULAR; INTRAVENOUS at 21:15

## 2018-01-01 RX ADMIN — CLONIDINE HYDROCHLORIDE 0.2 MG: 0.1 TABLET ORAL at 04:00

## 2018-01-01 RX ADMIN — SODIUM CHLORIDE: 9 INJECTION, SOLUTION INTRAVENOUS at 05:17

## 2018-01-01 RX ADMIN — HEPARIN SODIUM 5000 UNITS: 5000 INJECTION, SOLUTION INTRAVENOUS; SUBCUTANEOUS at 13:59

## 2018-01-01 RX ADMIN — KETOROLAC TROMETHAMINE 15 MG: 30 INJECTION, SOLUTION INTRAMUSCULAR at 23:32

## 2018-01-01 RX ADMIN — KETOROLAC TROMETHAMINE 15 MG: 30 INJECTION, SOLUTION INTRAMUSCULAR at 17:19

## 2018-01-01 RX ADMIN — POTASSIUM CHLORIDE 40 MEQ: 1500 TABLET, EXTENDED RELEASE ORAL at 16:45

## 2018-01-01 RX ADMIN — Medication 2 SPRAY: at 13:00

## 2018-01-01 RX ADMIN — SODIUM CHLORIDE: 9 INJECTION, SOLUTION INTRAVENOUS at 17:51

## 2018-01-01 RX ADMIN — POTASSIUM CHLORIDE 20 MEQ: 1500 TABLET, EXTENDED RELEASE ORAL at 16:41

## 2018-01-01 RX ADMIN — EPINEPHRINE 1 MG: 1 INJECTION, SOLUTION INTRAMUSCULAR; SUBCUTANEOUS at 09:53

## 2018-01-01 RX ADMIN — MORPHINE SULFATE 4 MG: 4 INJECTION, SOLUTION INTRAMUSCULAR; INTRAVENOUS at 11:15

## 2018-01-01 RX ADMIN — HEPARIN SODIUM 5000 UNITS: 5000 INJECTION, SOLUTION INTRAVENOUS; SUBCUTANEOUS at 14:58

## 2018-01-01 RX ADMIN — AMPICILLIN SODIUM AND SULBACTAM SODIUM 3 G: 2; 1 INJECTION, POWDER, FOR SOLUTION INTRAMUSCULAR; INTRAVENOUS at 17:19

## 2018-01-01 RX ADMIN — CYCLOBENZAPRINE 10 MG: 10 TABLET, FILM COATED ORAL at 20:25

## 2018-01-01 RX ADMIN — STANDARDIZED SENNA CONCENTRATE AND DOCUSATE SODIUM 2 TABLET: 8.6; 5 TABLET, FILM COATED ORAL at 09:31

## 2018-01-01 RX ADMIN — HYDRALAZINE HYDROCHLORIDE 20 MG: 20 INJECTION INTRAMUSCULAR; INTRAVENOUS at 02:30

## 2018-01-01 RX ADMIN — MAGNESIUM HYDROXIDE 30 ML: 400 SUSPENSION ORAL at 13:58

## 2018-01-01 RX ADMIN — TRAMADOL HYDROCHLORIDE 50 MG: 50 TABLET, FILM COATED ORAL at 23:36

## 2018-01-01 RX ADMIN — AMPICILLIN SODIUM AND SULBACTAM SODIUM 3 G: 2; 1 INJECTION, POWDER, FOR SOLUTION INTRAMUSCULAR; INTRAVENOUS at 20:12

## 2018-01-01 RX ADMIN — AMPICILLIN SODIUM AND SULBACTAM SODIUM 3 G: 2; 1 INJECTION, POWDER, FOR SOLUTION INTRAMUSCULAR; INTRAVENOUS at 05:15

## 2018-01-01 RX ADMIN — ACETAMINOPHEN 650 MG: 325 TABLET, FILM COATED ORAL at 21:15

## 2018-01-01 RX ADMIN — POLYETHYLENE GLYCOL 3350 1 PACKET: 17 POWDER, FOR SOLUTION ORAL at 09:31

## 2018-01-01 RX ADMIN — BISACODYL 10 MG: 10 SUPPOSITORY RECTAL at 05:02

## 2018-01-01 RX ADMIN — KETOROLAC TROMETHAMINE 15 MG: 30 INJECTION, SOLUTION INTRAMUSCULAR at 18:50

## 2018-01-01 RX ADMIN — CYCLOBENZAPRINE 10 MG: 10 TABLET, FILM COATED ORAL at 13:47

## 2018-01-01 RX ADMIN — SODIUM CHLORIDE 1000 ML: 9 INJECTION, SOLUTION INTRAVENOUS at 12:30

## 2018-01-01 RX ADMIN — ACETAMINOPHEN 650 MG: 325 TABLET, FILM COATED ORAL at 05:01

## 2018-01-01 RX ADMIN — LORAZEPAM 1 MG: 2 INJECTION INTRAMUSCULAR at 11:35

## 2018-01-01 RX ADMIN — EPINEPHRINE 1 MG: 1 INJECTION, SOLUTION INTRAMUSCULAR; SUBCUTANEOUS at 09:58

## 2018-01-01 RX ADMIN — ACETAMINOPHEN 650 MG: 325 TABLET, FILM COATED ORAL at 05:02

## 2018-01-01 RX ADMIN — ACETAMINOPHEN 650 MG: 325 TABLET, FILM COATED ORAL at 18:47

## 2018-01-01 RX ADMIN — ACETAMINOPHEN 650 MG: 325 TABLET, FILM COATED ORAL at 17:19

## 2018-01-01 RX ADMIN — ACETAMINOPHEN 650 MG: 325 TABLET, FILM COATED ORAL at 23:32

## 2018-01-01 RX ADMIN — KETOROLAC TROMETHAMINE 15 MG: 30 INJECTION, SOLUTION INTRAMUSCULAR at 05:15

## 2018-01-01 RX ADMIN — CYCLOBENZAPRINE 10 MG: 10 TABLET, FILM COATED ORAL at 23:55

## 2018-01-01 RX ADMIN — AMPICILLIN SODIUM AND SULBACTAM SODIUM 3 G: 2; 1 INJECTION, POWDER, FOR SOLUTION INTRAMUSCULAR; INTRAVENOUS at 23:32

## 2018-01-01 RX ADMIN — KETOROLAC TROMETHAMINE 15 MG: 30 INJECTION, SOLUTION INTRAMUSCULAR at 00:48

## 2018-01-01 RX ADMIN — HEPARIN SODIUM 5000 UNITS: 5000 INJECTION, SOLUTION INTRAVENOUS; SUBCUTANEOUS at 21:15

## 2018-01-01 RX ADMIN — INFLUENZA A VIRUS A/MICHIGAN/45/2015 X-275 (H1N1) ANTIGEN (FORMALDEHYDE INACTIVATED), INFLUENZA A VIRUS A/SINGAPORE/INFIMH-16-0019/2016 IVR-186 (H3N2) ANTIGEN (FORMALDEHYDE INACTIVATED), INFLUENZA B VIRUS B/PHUKET/3073/2013 ANTIGEN (FORMALDEHYDE INACTIVATED), AND INFLUENZA B VIRUS B/MARYLAND/15/2016 BX-69A ANTIGEN (FORMALDEHYDE INACTIVATED) 0.5 ML: 15; 15; 15; 15 INJECTION, SUSPENSION INTRAMUSCULAR at 20:08

## 2018-01-01 RX ADMIN — ENALAPRILAT 1.25 MG: 1.25 INJECTION INTRAVENOUS at 10:23

## 2018-01-01 RX ADMIN — SODIUM CHLORIDE: 9 INJECTION, SOLUTION INTRAVENOUS at 20:13

## 2018-01-01 RX ADMIN — KETOROLAC TROMETHAMINE 15 MG: 30 INJECTION, SOLUTION INTRAMUSCULAR at 17:35

## 2018-01-01 RX ADMIN — KETOROLAC TROMETHAMINE 15 MG: 30 INJECTION, SOLUTION INTRAMUSCULAR at 12:26

## 2018-01-01 RX ADMIN — HYDRALAZINE HYDROCHLORIDE 20 MG: 20 INJECTION INTRAMUSCULAR; INTRAVENOUS at 14:36

## 2018-01-01 RX ADMIN — PNEUMOCOCCAL VACCINE POLYVALENT 25 MCG
25; 25; 25; 25; 25; 25; 25; 25; 25; 25; 25; 25; 25; 25; 25; 25; 25; 25; 25; 25; 25; 25; 25 INJECTION, SOLUTION INTRAMUSCULAR; SUBCUTANEOUS at 20:06

## 2018-01-01 RX ADMIN — SODIUM CHLORIDE 1000 ML: 9 INJECTION, SOLUTION INTRAVENOUS at 17:45

## 2018-01-01 RX ADMIN — HEPARIN SODIUM 5000 UNITS: 5000 INJECTION, SOLUTION INTRAVENOUS; SUBCUTANEOUS at 23:36

## 2018-01-01 RX ADMIN — KETOROLAC TROMETHAMINE 15 MG: 30 INJECTION, SOLUTION INTRAMUSCULAR at 08:51

## 2018-01-01 RX ADMIN — STANDARDIZED SENNA CONCENTRATE AND DOCUSATE SODIUM 2 TABLET: 8.6; 5 TABLET, FILM COATED ORAL at 17:34

## 2018-01-01 RX ADMIN — ACETAMINOPHEN 650 MG: 325 TABLET, FILM COATED ORAL at 08:53

## 2018-01-01 RX ADMIN — CYCLOBENZAPRINE 10 MG: 10 TABLET, FILM COATED ORAL at 18:46

## 2018-01-01 RX ADMIN — HEPARIN SODIUM 5000 UNITS: 5000 INJECTION, SOLUTION INTRAVENOUS; SUBCUTANEOUS at 04:55

## 2018-01-01 RX ADMIN — AMPICILLIN SODIUM AND SULBACTAM SODIUM 3 G: 2; 1 INJECTION, POWDER, FOR SOLUTION INTRAMUSCULAR; INTRAVENOUS at 05:03

## 2018-01-01 RX ADMIN — HYDRALAZINE HYDROCHLORIDE 20 MG: 20 INJECTION INTRAMUSCULAR; INTRAVENOUS at 20:05

## 2018-01-01 RX ADMIN — AMPICILLIN SODIUM AND SULBACTAM SODIUM 3 G: 2; 1 INJECTION, POWDER, FOR SOLUTION INTRAMUSCULAR; INTRAVENOUS at 13:49

## 2018-01-01 RX ADMIN — ACETAMINOPHEN 650 MG: 325 TABLET, FILM COATED ORAL at 04:00

## 2018-01-01 RX ADMIN — AMPICILLIN SODIUM AND SULBACTAM SODIUM 3 G: 2; 1 INJECTION, POWDER, FOR SOLUTION INTRAMUSCULAR; INTRAVENOUS at 11:15

## 2018-01-01 RX ADMIN — NICOTINE 21 MG: 21 PATCH, EXTENDED RELEASE TRANSDERMAL at 17:29

## 2018-01-01 RX ADMIN — HYDRALAZINE HYDROCHLORIDE 20 MG: 20 INJECTION INTRAMUSCULAR; INTRAVENOUS at 17:33

## 2018-01-01 RX ADMIN — ACETAMINOPHEN 650 MG: 325 TABLET, FILM COATED ORAL at 23:39

## 2018-01-01 RX ADMIN — ACETAMINOPHEN 650 MG: 325 TABLET, FILM COATED ORAL at 17:34

## 2018-01-01 RX ADMIN — CYCLOBENZAPRINE 10 MG: 10 TABLET, FILM COATED ORAL at 05:05

## 2018-01-01 RX ADMIN — KETOROLAC TROMETHAMINE 15 MG: 30 INJECTION, SOLUTION INTRAMUSCULAR at 06:11

## 2018-01-01 RX ADMIN — EPINEPHRINE 1 MG: 0.1 INJECTION, SOLUTION ENDOTRACHEAL; INTRACARDIAC; INTRAVENOUS at 10:01

## 2018-01-01 RX ADMIN — EPINEPHRINE 1 MG: 1 INJECTION, SOLUTION INTRAMUSCULAR; SUBCUTANEOUS at 09:50

## 2018-01-01 RX ADMIN — PROCHLORPERAZINE EDISYLATE 10 MG: 5 INJECTION INTRAMUSCULAR; INTRAVENOUS at 02:30

## 2018-01-01 RX ADMIN — HYDRALAZINE HYDROCHLORIDE 20 MG: 20 INJECTION INTRAMUSCULAR; INTRAVENOUS at 20:17

## 2018-01-01 RX ADMIN — HEPARIN SODIUM 5000 UNITS: 5000 INJECTION, SOLUTION INTRAVENOUS; SUBCUTANEOUS at 05:15

## 2018-01-01 RX ADMIN — AMPICILLIN SODIUM AND SULBACTAM SODIUM 3 G: 2; 1 INJECTION, POWDER, FOR SOLUTION INTRAMUSCULAR; INTRAVENOUS at 23:38

## 2018-01-01 RX ADMIN — CEFTRIAXONE SODIUM 2 G: 2 INJECTION, POWDER, FOR SOLUTION INTRAMUSCULAR; INTRAVENOUS at 04:55

## 2018-01-01 RX ADMIN — BISACODYL 10 MG: 10 SUPPOSITORY RECTAL at 05:03

## 2018-01-01 RX ADMIN — EPINEPHRINE 1 MG: 1 INJECTION, SOLUTION INTRAMUSCULAR; SUBCUTANEOUS at 09:47

## 2018-01-01 RX ADMIN — GADOBUTROL 8 ML: 604.72 INJECTION INTRAVENOUS at 16:41

## 2018-01-01 RX ADMIN — STANDARDIZED SENNA CONCENTRATE AND DOCUSATE SODIUM 2 TABLET: 8.6; 5 TABLET, FILM COATED ORAL at 17:28

## 2018-01-01 RX ADMIN — ACETAMINOPHEN 650 MG: 325 TABLET, FILM COATED ORAL at 03:15

## 2018-01-01 RX ADMIN — ONDANSETRON 4 MG: 2 INJECTION INTRAMUSCULAR; INTRAVENOUS at 11:35

## 2018-01-01 RX ADMIN — PROCHLORPERAZINE EDISYLATE 10 MG: 5 INJECTION INTRAMUSCULAR; INTRAVENOUS at 16:38

## 2018-01-01 RX ADMIN — CEFTRIAXONE SODIUM 2 G: 2 INJECTION, POWDER, FOR SOLUTION INTRAMUSCULAR; INTRAVENOUS at 05:02

## 2018-01-01 RX ADMIN — ACETAMINOPHEN 650 MG: 325 TABLET, FILM COATED ORAL at 04:54

## 2018-01-01 RX ADMIN — SODIUM CHLORIDE 1000 ML: 9 INJECTION, SOLUTION INTRAVENOUS at 11:35

## 2018-01-01 RX ADMIN — HYDRALAZINE HYDROCHLORIDE 20 MG: 20 INJECTION INTRAMUSCULAR; INTRAVENOUS at 05:01

## 2018-01-01 RX ADMIN — HEPARIN SODIUM 5000 UNITS: 5000 INJECTION, SOLUTION INTRAVENOUS; SUBCUTANEOUS at 05:03

## 2018-01-01 RX ADMIN — HEPARIN SODIUM 5000 UNITS: 5000 INJECTION, SOLUTION INTRAVENOUS; SUBCUTANEOUS at 14:35

## 2018-01-01 RX ADMIN — SODIUM BICARBONATE 50 MEQ: 84 INJECTION, SOLUTION INTRAVENOUS at 09:55

## 2018-01-01 RX ADMIN — STANDARDIZED SENNA CONCENTRATE AND DOCUSATE SODIUM 2 TABLET: 8.6; 5 TABLET, FILM COATED ORAL at 05:01

## 2018-01-01 RX ADMIN — POTASSIUM CHLORIDE 20 MEQ: 1500 TABLET, EXTENDED RELEASE ORAL at 17:34

## 2018-01-01 RX ADMIN — NICOTINE 21 MG: 21 PATCH, EXTENDED RELEASE TRANSDERMAL at 04:55

## 2018-01-01 RX ADMIN — CEFTRIAXONE SODIUM 2 G: 2 INJECTION, POWDER, FOR SOLUTION INTRAMUSCULAR; INTRAVENOUS at 05:24

## 2018-01-01 RX ADMIN — STANDARDIZED SENNA CONCENTRATE AND DOCUSATE SODIUM 2 TABLET: 8.6; 5 TABLET, FILM COATED ORAL at 05:03

## 2018-01-01 RX ADMIN — KETOROLAC TROMETHAMINE 15 MG: 30 INJECTION, SOLUTION INTRAMUSCULAR at 00:28

## 2018-01-01 RX ADMIN — HEPARIN SODIUM 5000 UNITS: 5000 INJECTION, SOLUTION INTRAVENOUS; SUBCUTANEOUS at 05:01

## 2018-01-01 RX ADMIN — SODIUM CHLORIDE 1000 ML: 9 INJECTION, SOLUTION INTRAVENOUS at 15:15

## 2018-01-01 RX ADMIN — STANDARDIZED SENNA CONCENTRATE AND DOCUSATE SODIUM 2 TABLET: 8.6; 5 TABLET, FILM COATED ORAL at 17:19

## 2018-01-01 RX ADMIN — BISACODYL 10 MG: 10 SUPPOSITORY RECTAL at 04:55

## 2018-01-01 RX ADMIN — CEFTRIAXONE SODIUM 2 G: 2 INJECTION, POWDER, FOR SOLUTION INTRAMUSCULAR; INTRAVENOUS at 06:12

## 2018-01-01 RX ADMIN — KETOROLAC TROMETHAMINE 15 MG: 30 INJECTION, SOLUTION INTRAMUSCULAR at 00:34

## 2018-01-01 RX ADMIN — HEPARIN SODIUM 5000 UNITS: 5000 INJECTION, SOLUTION INTRAVENOUS; SUBCUTANEOUS at 22:37

## 2018-01-01 RX ADMIN — KETOROLAC TROMETHAMINE 15 MG: 30 INJECTION, SOLUTION INTRAMUSCULAR at 18:09

## 2018-01-01 RX ADMIN — SODIUM CHLORIDE: 9 INJECTION, SOLUTION INTRAVENOUS at 05:10

## 2018-01-01 RX ADMIN — BISACODYL 10 MG: 10 SUPPOSITORY RECTAL at 09:37

## 2018-01-01 RX ADMIN — SODIUM BICARBONATE 50 MEQ: 84 INJECTION, SOLUTION INTRAVENOUS at 10:00

## 2018-01-01 RX ADMIN — KETOROLAC TROMETHAMINE 30 MG: 30 INJECTION, SOLUTION INTRAMUSCULAR at 11:53

## 2018-01-01 RX ADMIN — MAGNESIUM HYDROXIDE 30 ML: 400 SUSPENSION ORAL at 01:05

## 2018-01-01 RX ADMIN — NICOTINE 21 MG: 21 PATCH, EXTENDED RELEASE TRANSDERMAL at 05:01

## 2018-01-01 ASSESSMENT — PAIN SCALES - WONG BAKER: WONGBAKER_NUMERICALRESPONSE: HURTS AS MUCH AS POSSIBLE

## 2018-01-01 ASSESSMENT — ENCOUNTER SYMPTOMS
ABDOMINAL PAIN: 0
BRUISES/BLEEDS EASILY: 0
BRUISES/BLEEDS EASILY: 0
COUGH: 0
CARDIOVASCULAR NEGATIVE: 1
CHILLS: 0
COUGH: 0
EYES NEGATIVE: 1
VOMITING: 0
CONSTITUTIONAL NEGATIVE: 1
CONSTIPATION: 0
CONSTIPATION: 0
DIARRHEA: 0
FOCAL WEAKNESS: 0
CONSTITUTIONAL NEGATIVE: 1
NEUROLOGICAL NEGATIVE: 1
CARDIOVASCULAR NEGATIVE: 1
CHILLS: 0
SPUTUM PRODUCTION: 0
DIAPHORESIS: 0
EYES NEGATIVE: 1
ABDOMINAL PAIN: 1
SHORTNESS OF BREATH: 0
HEADACHES: 1
RESPIRATORY NEGATIVE: 1
FOCAL WEAKNESS: 0
BLURRED VISION: 0
FEVER: 0
CHILLS: 0
DIAPHORESIS: 0
EYES NEGATIVE: 1
DIZZINESS: 0
DIAPHORESIS: 0
NAUSEA: 0
DEPRESSION: 0
BLURRED VISION: 0
CONSTITUTIONAL NEGATIVE: 1
DIAPHORESIS: 0
SHORTNESS OF BREATH: 0
DIZZINESS: 0
CARDIOVASCULAR NEGATIVE: 1
BACK PAIN: 0
FOCAL WEAKNESS: 0
WEAKNESS: 0
ABDOMINAL PAIN: 0
WEAKNESS: 0
RESPIRATORY NEGATIVE: 1
SORE THROAT: 0
MYALGIAS: 0
RESPIRATORY NEGATIVE: 1
CARDIOVASCULAR NEGATIVE: 1
NEUROLOGICAL NEGATIVE: 1
DIZZINESS: 0
BACK PAIN: 0
NECK PAIN: 1
BRUISES/BLEEDS EASILY: 0
VOMITING: 0
CONSTIPATION: 1
HEADACHES: 0
VOMITING: 0
PALPITATIONS: 0
ABDOMINAL PAIN: 0
FEVER: 0
BACK PAIN: 0
SHORTNESS OF BREATH: 0
HEADACHES: 0
FEVER: 0
SORE THROAT: 0
DEPRESSION: 0
MYALGIAS: 0
FEVER: 0
DEPRESSION: 0
DEPRESSION: 0
BRUISES/BLEEDS EASILY: 0
SORE THROAT: 0
HEADACHES: 0
COUGH: 1
PALPITATIONS: 0
CONSTIPATION: 0
RESPIRATORY NEGATIVE: 1
DIAPHORESIS: 0
FOCAL WEAKNESS: 0
CONSTITUTIONAL NEGATIVE: 1
NEUROLOGICAL NEGATIVE: 1
COUGH: 0
MYALGIAS: 0
VOMITING: 0
PALPITATIONS: 0
FEVER: 0
SHORTNESS OF BREATH: 0
BRUISES/BLEEDS EASILY: 0
SHORTNESS OF BREATH: 0
NEUROLOGICAL NEGATIVE: 1
HEADACHES: 1
EYES NEGATIVE: 1
MYALGIAS: 0
WEAKNESS: 0
SORE THROAT: 0
BLURRED VISION: 0
HEADACHES: 0
PALPITATIONS: 0
ABDOMINAL PAIN: 0
RESPIRATORY NEGATIVE: 1
BLURRED VISION: 0
NAUSEA: 0
DIZZINESS: 0
DEPRESSION: 0
NEUROLOGICAL NEGATIVE: 1
CONSTITUTIONAL NEGATIVE: 1
NAUSEA: 0
DIARRHEA: 0
MYALGIAS: 0
VOMITING: 0
DIZZINESS: 0
NAUSEA: 0
HEADACHES: 0
PALPITATIONS: 0
COUGH: 0
CARDIOVASCULAR NEGATIVE: 1
COUGH: 0
NERVOUS/ANXIOUS: 1
CHILLS: 0
BACK PAIN: 1
NAUSEA: 0
BLURRED VISION: 0
FOCAL WEAKNESS: 0
CONSTIPATION: 0
WEAKNESS: 0
BACK PAIN: 0
SORE THROAT: 0
CHILLS: 0
EYES NEGATIVE: 1
SPUTUM PRODUCTION: 0
WEAKNESS: 0

## 2018-01-01 ASSESSMENT — COGNITIVE AND FUNCTIONAL STATUS - GENERAL
MOBILITY SCORE: 17
SUGGESTED CMS G CODE MODIFIER DAILY ACTIVITY: CJ
WALKING IN HOSPITAL ROOM: A LITTLE
DAILY ACTIVITIY SCORE: 22
HELP NEEDED FOR BATHING: A LITTLE
SUGGESTED CMS G CODE MODIFIER DAILY ACTIVITY: CH
SUGGESTED CMS G CODE MODIFIER MOBILITY: CK
TURNING FROM BACK TO SIDE WHILE IN FLAT BAD: A LITTLE
MOVING FROM LYING ON BACK TO SITTING ON SIDE OF FLAT BED: A LITTLE
DAILY ACTIVITIY SCORE: 24
CLIMB 3 TO 5 STEPS WITH RAILING: A LITTLE
SUGGESTED CMS G CODE MODIFIER MOBILITY: CH
MOBILITY SCORE: 24
MOVING TO AND FROM BED TO CHAIR: A LOT
STANDING UP FROM CHAIR USING ARMS: A LITTLE
DRESSING REGULAR LOWER BODY CLOTHING: A LITTLE

## 2018-01-01 ASSESSMENT — PAIN SCALES - GENERAL
PAINLEVEL_OUTOF10: 8
PAINLEVEL_OUTOF10: 7
PAINLEVEL_OUTOF10: 9
PAINLEVEL_OUTOF10: 0
PAINLEVEL_OUTOF10: 10
PAINLEVEL_OUTOF10: 3
PAINLEVEL_OUTOF10: 6
PAINLEVEL_OUTOF10: 8
PAINLEVEL_OUTOF10: 2
PAINLEVEL_OUTOF10: 9
PAINLEVEL_OUTOF10: 7
PAINLEVEL_OUTOF10: 9
PAINLEVEL_OUTOF10: 6
PAINLEVEL_OUTOF10: 3
PAINLEVEL_OUTOF10: 0
PAINLEVEL_OUTOF10: 6
PAINLEVEL_OUTOF10: 5
PAINLEVEL_OUTOF10: 3
PAINLEVEL_OUTOF10: 4
PAINLEVEL_OUTOF10: 10
PAINLEVEL_OUTOF10: 10
PAINLEVEL_OUTOF10: 3
PAINLEVEL_OUTOF10: 7
PAINLEVEL_OUTOF10: 8
PAINLEVEL_OUTOF10: 10
PAINLEVEL_OUTOF10: 0
PAINLEVEL_OUTOF10: 8
PAINLEVEL_OUTOF10: 3
PAINLEVEL_OUTOF10: 8
PAINLEVEL_OUTOF10: 10
PAINLEVEL_OUTOF10: 6
PAINLEVEL_OUTOF10: 10
PAINLEVEL_OUTOF10: 5
PAINLEVEL_OUTOF10: 7
PAINLEVEL_OUTOF10: 0
PAINLEVEL_OUTOF10: 7

## 2018-01-01 ASSESSMENT — LIFESTYLE VARIABLES
EVER_SMOKED: YES
SUBSTANCE_ABUSE: 1
SUBSTANCE_ABUSE: 1
EVER_SMOKED: YES
SUBSTANCE_ABUSE: 1
SUBSTANCE_ABUSE: 1
ALCOHOL_USE: NO
SUBSTANCE_ABUSE: 1

## 2018-01-01 ASSESSMENT — PATIENT HEALTH QUESTIONNAIRE - PHQ9
2. FEELING DOWN, DEPRESSED, IRRITABLE, OR HOPELESS: NOT AT ALL
2. FEELING DOWN, DEPRESSED, IRRITABLE, OR HOPELESS: NOT AT ALL
1. LITTLE INTEREST OR PLEASURE IN DOING THINGS: NOT AT ALL
SUM OF ALL RESPONSES TO PHQ9 QUESTIONS 1 AND 2: 0
1. LITTLE INTEREST OR PLEASURE IN DOING THINGS: NOT AT ALL
SUM OF ALL RESPONSES TO PHQ9 QUESTIONS 1 AND 2: 0
SUM OF ALL RESPONSES TO PHQ9 QUESTIONS 1 AND 2: 0
2. FEELING DOWN, DEPRESSED, IRRITABLE, OR HOPELESS: NOT AT ALL
1. LITTLE INTEREST OR PLEASURE IN DOING THINGS: NOT AT ALL
1. LITTLE INTEREST OR PLEASURE IN DOING THINGS: NOT AT ALL
2. FEELING DOWN, DEPRESSED, IRRITABLE, OR HOPELESS: NOT AT ALL
SUM OF ALL RESPONSES TO PHQ9 QUESTIONS 1 AND 2: 0

## 2018-01-01 ASSESSMENT — GAIT ASSESSMENTS
DISTANCE (FEET): 200
GAIT LEVEL OF ASSIST: SUPERVISED

## 2018-01-01 ASSESSMENT — ACTIVITIES OF DAILY LIVING (ADL): TOILETING: INDEPENDENT

## 2018-12-11 PROBLEM — I95.9 HYPOTENSION: Status: ACTIVE | Noted: 2018-01-01

## 2018-12-11 PROBLEM — Z72.0 NICOTINE ABUSE: Status: ACTIVE | Noted: 2018-01-01

## 2018-12-11 PROBLEM — F15.10 METHAMPHETAMINE ABUSE (HCC): Status: ACTIVE | Noted: 2018-01-01

## 2018-12-11 PROBLEM — D72.829 LEUKOCYTOSIS: Status: ACTIVE | Noted: 2018-01-01

## 2018-12-11 PROBLEM — N17.9 ACUTE KIDNEY INJURY (HCC): Status: ACTIVE | Noted: 2018-01-01

## 2018-12-11 PROBLEM — G93.40 ENCEPHALOPATHY ACUTE: Status: ACTIVE | Noted: 2018-01-01

## 2018-12-11 NOTE — ED PROVIDER NOTES
"ED Provider Note    CHIEF COMPLAINT  Chief Complaint   Patient presents with   • Leg Pain   • Chest Pain       HPI  Trevor Serrano is a 48 y.o. male who presents with complaint of sudden onset of severe pain to his lower back.  The patient is a poor historian, he is writhing about the bed, will not answer questions, just outside he is having severe pain.  He say the pain began several days became much worse today.  He is also complaining burning pain to his left lower extremity along with numbness and tingling.  Patient says he cannot feel his left leg.  He denies any previous back injury or problems.  The patient has had no recent fall, trauma, or injury to his back.  The patient did complain of having some chest pain shortness of breath to EMS, but denies any current chest pain.  He has not been ill with any fever, chills, sore throat, cough, vomiting, or diarrhea.  The patient does admit to methamphetamine use, but denies any IV drug use.    REVIEW OF SYSTEMS  See HPI for further details. All other systems are negative.     PAST MEDICAL HISTORY  No past medical history on file.    FAMILY HISTORY  No family history on file.    SOCIAL HISTORY  Social History     Social History   • Marital status: N/A     Spouse name: N/A   • Number of children: N/A   • Years of education: N/A     Social History Main Topics   • Smoking status: Not on file   • Smokeless tobacco: Not on file   • Alcohol use Not on file   • Drug use: Unknown   • Sexual activity: Not on file     Other Topics Concern   • Not on file     Social History Narrative   • No narrative on file       SURGICAL HISTORY  No past surgical history on file.    CURRENT MEDICATIONS  Home Medications    **Home medications have not yet been reviewed for this encounter**         ALLERGIES  No Known Allergies    PHYSICAL EXAM  VITAL SIGNS: /64   Pulse 66   Resp (!) 22   Ht 1.676 m (5' 6\")   Wt 83.9 kg (185 lb)   BMI 29.86 kg/m²   Constitutional: Awake, very agitated " and anxious, appears very uncomfortable, writhing on the bed, crying out complaining of pain to his back.  HENT: Atraumatic. Bilateral external ears normal, mucous membranes moist, throat nonerythematous without exudates, nose is normal.  Eyes: PERRL, EOMI, conjunctiva moist, noninjected.  Neck: Nontender, Normal range of motion, No nuchal rigidity, No stridor.   Lymphatic: No lymphadenopathy noted.   Cardiovascular: Regular rate and rhythm, no murmurs, rubs, gallops.  Thorax & Lungs:  Good breath sounds bilaterally, no wheezes, rales, or retractions.  No chest tenderness.  Abdomen: Bowel sounds normal, Soft, nontender, nondistended, no rebound, guarding, masses.  Back: No spinal tenderness, there is moderate left CVA tenderness.  Extremities: Intact distal pulses, No edema, No no joint swelling or tenderness.  Patient complains of numbness to the entire left leg.  Skin: Warm, Dry, No rashes.   Musculoskeletal: No joint swelling or tenderness.  Neurologic: Alert & oriented x 3, sensory and motor function normal. No focal deficits.   Psychiatric: Affect normal, Judgment normal, Mood normal.     EKG  Twelve-lead EKG shows a normal sinus rhythm, rate of 63, normal intervals, normal axis, there is slight ST elevations in the inferior anterior leads, no acute ST depressions, no pathologic Q waves, no evidence of any acute injury or ischemic pattern on my reading, the ST elevations may indicate pericarditis, there is no previous EKG for comparison peer        RADIOLOGY/PROCEDURES  MR-LUMBAR SPINE-WITH & W/O   Final Result      1.  Grade 1 spondylolysis of L5 on S1.   2.  Bilateral L5 spondylolysis.   3.  Moderate bilateral L5 neural foraminal stenosis.      CT-RENAL COLIC EVALUATION(A/P W/O)   Final Result      No evidence of renal, ureteral or bladder calculi. No hydronephrosis.      No acute intra-abdominal abnormality is seen.      Hepatic steatosis.      Small hypodense left renal lesions are too small to  characterize. These may represent small cysts.      Patchy right lower lobe airspace opacities may represent atelectasis or pneumonitis.      Atherosclerotic plaque.      Bilateral pars defects at L5 with mild grade 1 anterolisthesis of L5 on S1.         DX-CHEST-PORTABLE (1 VIEW)   Final Result      No acute cardiopulmonary process is seen.            COURSE & MEDICAL DECISION MAKING  Pertinent Labs & Imaging studies reviewed. (See chart for details)  The patient presents with the above complaints.  He appears to be in significant discomfort as he is writhing about on the gurney.  IV was placed, he was given a dose of morphine and Zofran.  The patient reported little relief, continues to writh about on the bed.  Given his symptoms to the differential includes possible renal colic or renal stone, epidural abscess, ischemic bowel or some other intraabdominal process .  He also complains of severe back pain, and given his history of methamphetamine abuse he could have an infectious etiology.  He does move the left leg well and only complains of numbness.  He was given a dose of Toradol, additional morphine, and Ativan.  After that he appear to be resting comfortably.  He was noted to be hypotensive and with concern for sepsis or possible surgical process he is NPO, and was not given oral fluids.  He was given a bolus of normal saline 2 L.  On recheck, he remained altered and hypotensive and was continued on the third liter of fluid.  CBC shows a white count 13,200, hemoglobin 14.0, normal differential, sedimentation rate normal 6, C-reactive protein normal 0.27, chemistry shows a glucose of 103, BUN 26, creatinine 1.44, liver function test normal, lactic acid normal 1.7, troponin less than 0.01, INR 1.07.  The patient shows no no obvious source for infection.  Given his back pain and history and symptoms, MRI of the lumbar spine with and without contrast was obtained.  This showed the above findings with no evidence  of an epidural abscess, abnormal fluid collections, spinal stenosis, or ruptured disc.  Given his symptoms, patient will be admitted.  Case was discussed with Dr. Iniguez.    FINAL IMPRESSION  1.  Acute left lower back pain  2.  Paresthesias to the left leg  3.  Methamphetamine abuse  4.  Possible sepsis  5.  Hypotension  6.  Altered mental status         Electronically signed by: Benji Souza, 12/11/2018 11:20 AM

## 2018-12-11 NOTE — ED NOTES
Dr Souza notified of patient's pressure of 79/36 and patient being diaphoretic.  Orders for more fluids.  Patient placed on 3L of oxygen after receiving pain medications.    Patient resting, mother at bedside

## 2018-12-11 NOTE — LETTER
[facility], EMERGENCY DEPT   [address] [place] [id number]  Phone: Dept: [phone] - Fax:        [facility] Progress Report and Disability Certification  Date of Service: 12/11/2018   No Show:  No  Date / Time of Next Visit:     Claim Information   Patient Name: Trevor Serrano  Claim Number:     Employer:    Date of Injury:      Insurer / TPA:   ID / SSN: [id number]    Occupation:  Diagnosis: There were no encounter diagnoses.    Medical Information   Related to Industrial Injury?   ***   Subjective Complaints:      Objective Findings:     Pre-Existing Condition(s):     Assessment:        Status:    Permanent Disability:     Plan:      Diagnostics:      Comments:       Disability Information   Status:      From:     Through:   Restrictions are:     Physical Restrictions   Sitting:    Standing:    Stooping:    Bending:      Squatting:    Walking:    Climbing:    Pushing:      Pulling:    Other:    Reaching Above Shoulder (L):   Reaching Above Shoulder (R):       Reaching Below Shoulder (L):    Reaching Below Shoulder (R):      Not to exceed Weight Limits   Carrying(hrs):   Weight Limit(lb):   Lifting(hrs):   Weight  Limit(lb):     Comments:      Repetitive Actions   Hands: i.e. Fine Manipulations from Grasping:     Feet: i.e. Operating Foot Controls:     Driving / Operate Machinery:     Physician Name: Benji Souza Physician Signature:   e-Signature:  , Medical Director   Clinic Name / Location: [facility], EMERGENCY DEPT  [address] [place] [id number] [phone]     Clinic Phone Number: Dept: [phone]   Appointment Time:  Visit Start Time:    Check-In Time:  11:03 AM Visit Discharge Time:    Original-Treating Physician or Chiropractor    Page 2-Insurer/TPA    Page 3-Employer    Page 4-Employee

## 2018-12-11 NOTE — ED NOTES
Med rec updated complete.  Allergies reviewed. Pt is not currently taking medications.  No antibiotic use in last 30 days at home.

## 2018-12-11 NOTE — ED NOTES
Patient had an onset of chest pain, followed by pain in the back of his head, hx of migraines reports to EMS that it may be a migraine.  Patient started complaining of left leg numbness and pain and back pain.  Patient unable to hold still.

## 2018-12-12 PROBLEM — R10.9 ABDOMINAL PAIN: Status: ACTIVE | Noted: 2018-01-01

## 2018-12-12 PROBLEM — J18.9 PNEUMONIA: Status: ACTIVE | Noted: 2018-01-01

## 2018-12-12 PROBLEM — K59.00 CONSTIPATION: Status: ACTIVE | Noted: 2018-01-01

## 2018-12-12 NOTE — PROGRESS NOTES
Handoff report received. Assumed patient care.  Pt is waxing between sleep and wakefulness. Pt is AAOx4, with mother and grandmother at the bedside. Bed alarm is on. Safety precautions in place. Call light and personal belongings within reach. Educated to call for assistance if needed.

## 2018-12-12 NOTE — PROGRESS NOTES
Paged the Hospitalist due to patients continuing complaints of substernal chest pain, abdominal pain and back pain.  Pt has admitted to smoking methamphetamines approximately 1 hour prior to presenting at the ER.     Spoke with Dr. Palacios.Order per telephone for IV toradol, 15mg once. Continue to follow orders placed by admitting MD. Reviewed monitor summary, O2 sats of 100%, and labs.

## 2018-12-12 NOTE — PROGRESS NOTES
Pt is still having difficulty with pain. Medicated per Mar, Heat packs, position change and education provided. Will continue to monitor.

## 2018-12-12 NOTE — PROGRESS NOTES
Received bedside report from RN, pt care assumed, VSS, pt assessment complete. Pt AAOx4, pt c/o stomach and back pain at this time. No signs of acute distress noted at this time. POC discussed with pt and verbalizes no questions. Pt denies any additional needs at this time. Bed in lowest position, bed alarm on, pt educated on fall risk and needs reinforcement as he is impulsive, call light within reach, hourly rounding initiated.

## 2018-12-12 NOTE — PROGRESS NOTES
Received report from Caleb LINK. Patient transported to telemetry by BEE RN and on zoll monitor. Telemetry monitor applied on telemetry floor. Patient A&Ox4. On 3L NC. Call light and belongings within reach. Bed in lowest locked position. Upper side rails raised. Hourly rounding in place.

## 2018-12-12 NOTE — PROGRESS NOTES
Paged Admitting MD concerning patients pain . Patient is writhing in pain on his bed, Stating worst pain of his life. Dr. Iniguez returned the call immediately, stated his concern, is ordering muscle relaxer. Ice pack provided, assisted the patient with positional changes. Will continue to monitor and assist the patient with complementary measures for pain relief.

## 2018-12-12 NOTE — ASSESSMENT & PLAN NOTE
Resolved  Suspect multifactorial etiology with intoxication or w/d contributed, sepsis could have also contributed

## 2018-12-12 NOTE — H&P
Hospital Medicine History and Physical      Date of Service  2018    Chief Complaint  Chief Complaint   Patient presents with   • Leg Pain   • Chest Pain       History of Presenting Illness  Maggie is a 48 y.o. male PMH of methamphetamine abuse, who presents with low back pain started earlier today.  The patient is somewhat poor historian.  He kept falling asleep during the interview.  He stated that he started having lower back pain earlier today which he described as constant pain with no radiation.  The patient also denied lower extremity weakness.  Because of intractable pain he decided to come to ER.  In the ER he was found to have hypotensive with systolic blood pressure around 70s.  He was given 1 L of IV fluid bolus and his systolic blood pressure go up to around 95.   He had a CT scan of renal done with no acute finding.  Also MRI of the lumbar spine was done only showing moderate spinal stenosis.  He will be admitted for further evaluation.    Primary Care Physician  No primary care provider on file.      Code Status  Full code    Review of Systems  Review of Systems   Unable to perform ROS: Mental acuity     Please see HPI, all other systems were reviewed and are negative (AMA/CMS criteria)     Past Medical History  Cannot be obtained due to his mental acuity    Surgical History  Cannot be obtained    Medications  Cannot be obtained  Family History  Cannot be obtained      Social History  Smoke 1 pack of cigarettes a day  Inhaled marijuana  Denies alcohol use    Allergies  No Known Allergies     Physical Exam  Laboratory   Hemodynamics  Temp (24hrs), Av.7 °C (96.3 °F), Min:35.6 °C (96.1 °F), Max:35.8 °C (96.4 °F)   Temperature: (!) 35.8 °C (96.4 °F)  Pulse  Av.7  Min: 49  Max: 66 Heart Rate (Monitored): (!) 52  Blood Pressure: 108/64, NIBP: (!) 97/51      Respiratory      Respiration: 13, Pulse Oximetry: 92 %             Physical Exam   Constitutional:   Sleepy and lethargic   HENT:    Head: Normocephalic and atraumatic.   Mouth/Throat: Oropharynx is clear and moist.   Eyes: Pupils are equal, round, and reactive to light. Conjunctivae and EOM are normal.   Neck: Normal range of motion. Neck supple. No tracheal deviation present. No thyromegaly present.   Cardiovascular: Normal rate and regular rhythm.    No murmur heard.  Pulmonary/Chest: Effort normal and breath sounds normal. No respiratory distress. He has no wheezes.   Abdominal: Soft. Bowel sounds are normal. He exhibits no distension. There is no tenderness.   Musculoskeletal: He exhibits tenderness. He exhibits no edema.   Low back pain   Skin: Skin is warm and dry. No erythema.       Recent Labs      12/11/18   1137   WBC  13.2*   RBC  4.72   HEMOGLOBIN  14.0   HEMATOCRIT  41.6*   MCV  88.1   MCH  29.7   MCHC  33.7   RDW  43.4   PLATELETCT  233   MPV  9.4     Recent Labs      12/11/18   1137   SODIUM  140   POTASSIUM  3.7   CHLORIDE  110   CO2  22   GLUCOSE  103*   BUN  26*   CREATININE  1.44*   CALCIUM  8.4*     Recent Labs      12/11/18   1137   ALTSGPT  32   ASTSGOT  17   ALKPHOSPHAT  58   TBILIRUBIN  0.4   GLUCOSE  103*     Recent Labs      12/11/18   1137   APTT  29.8   INR  1.07             Lab Results   Component Value Date    TROPONINI <0.01 12/11/2018       Imaging  MR-LUMBAR SPINE-WITH & W/O   Final Result      1.  Grade 1 spondylolysis of L5 on S1.   2.  Bilateral L5 spondylolysis.   3.  Moderate bilateral L5 neural foraminal stenosis.      CT-RENAL COLIC EVALUATION(A/P W/O)   Final Result      No evidence of renal, ureteral or bladder calculi. No hydronephrosis.      No acute intra-abdominal abnormality is seen.      Hepatic steatosis.      Small hypodense left renal lesions are too small to characterize. These may represent small cysts.      Patchy right lower lobe airspace opacities may represent atelectasis or pneumonitis.      Atherosclerotic plaque.      Bilateral pars defects at L5 with mild grade 1 anterolisthesis of  L5 on S1.         DX-CHEST-PORTABLE (1 VIEW)   Final Result      No acute cardiopulmonary process is seen.             Assessment/Plan     I anticipate this patient will require at least two midnights for appropriate medical management, necessitating inpatient admission.    Acute kidney injury (HCC)- (present on admission)   Assessment & Plan    Related to hypotension  Check urinalysis  On IV fluid  Avoid nephrotoxic drugs  Follow CMP closely     Hypotension- (present on admission)   Assessment & Plan    Responded well to IV fluid  Continue to monitor blood pressure closely  Continue IV fluids     Encephalopathy acute- (present on admission)   Assessment & Plan    Infectious versus drug use  Urine drug screen is currently pending, urinalysis, pending blood culture  CT scan showed right lower lobe infiltrate  Started on IV Unasyn for possible aspiration pneumonia  N.p.o.  Speech consulted for swallow evaluation       Leukocytosis- (present on admission)   Assessment & Plan    Plan as above     Nicotine abuse- (present on admission)   Assessment & Plan    Spent 3 minutes on smoking cessation education         Prophylaxis:  sc heparin  Critical care time spent 40 minutes without overlap, excludes procedure time.

## 2018-12-12 NOTE — THERAPY
"Physical Therapy Evaluation completed.   Bed Mobility:  Supine to Sit: Supervised  Transfers: Sit to Stand: Supervised  Gait: Level Of Assist: Supervised with No Equipment Needed       Plan of Care: Patient with no further skilled PT needs in the acute care setting at this time  Discharge Recommendations: Equipment: No Equipment Needed.  Maggie is a 48 y.o. male PMH of methamphetamine abuse, who presents with low back pain started earlier today. MRI shows moderate canal stenosis at L5. Today, pt was able to get up with supervision, and ambulate x 200 feet, and up/down 4 stairs with no AD with supervision. No further inpt PT  needs. Pt would benefit from outpt PT for LBP. See \"Rehab Therapy-Acute\" Patient Summary Report for complete documentation.     "

## 2018-12-12 NOTE — CARE PLAN
Problem: Knowledge Deficit  Goal: Knowledge of disease process/condition, treatment plan, diagnostic tests, and medications will improve  Outcome: PROGRESSING AS EXPECTED  Patient is educated of disease process and condition. Treatment team has included patient in plan of care. All medications indications and side effects are explained. Patient is encouraged to ask questions.     Problem: Pain Management  Goal: Pain level will decrease to patient's comfort goal  Patient educated to pain scale system. Patient encouraged to verbalize discomfort. Patient taught about non-pharmacological pain management, provided with an ice pack and positional changes. MD has been paged and updated.

## 2018-12-12 NOTE — THERAPY
"Speech Language Therapy Clinical Swallow Evaluation completed.  Functional Status: Clinical swallow evaluation completed on this date. Patient initially declined stating, \"I feel like my stomach needs to be emptied.\"  However, with education to role of SLP and NPO status he was agreeable and stated, \"I'm starving!\"  Patient followed directives to the oral [facility] exam with no gross deficits appreciated.  Presentation of PO included ice chips, purees, soft and mixed consistencies, dry solids, and thin liquids.  The patient presented with functional mastication and bolus manipulation, timely initiation of swallow trigger and complete laryngeal elevation upon palpation.  He maintained clear vocal quality throughout the session and had no overt s/sx of aspiration with any consistency consumed.  Patient was thrashing about in the bed and c/o back and stomach pain during the session but stated no nausea.  At this time, recommend initiation of a regular/thins diet as tolerated.  SLP to follow up x1 to further assess swallowing function and diet tolerance during a meal.    Recommendations - Diet: Diet / Liquid Recommendation: Thin Liquid, Regular                          Strategies: No Straws and Head of Bed at 90 Degrees                          Medication Administration: Medication Administration : Whole with Liquid Wash  Plan of Care: Will benefit from Speech Therapy 1 times per week  Post-Acute Therapy: Anticipate that the patient will have no further speech therapy needs after discharge from the hospital.      See \"Rehab Therapy-Acute\" Patient Summary Report for complete documentation.   "

## 2018-12-12 NOTE — PROGRESS NOTES
2 RN skin check with Naomi, RN.    -Overall flushing of skin head to toe  -Bilateral elbows red and blanching  -Moisture and redness within the groin and creases of the thighs  - scattered scabs on bilateral lower extremeties  -Cracked skin and scab on the big toe of the right foot. Pt states that he cut the nail too short.

## 2018-12-12 NOTE — ED NOTES
Fluids and/or medications that are being infused to patient at this time are continued with patient to floor.  No S/S of infiltrations, overload, or reactions, .

## 2018-12-12 NOTE — ASSESSMENT & PLAN NOTE
Leukocytosis in stable range  Possible vegetation on echo, suspected endocarditis, TIMA penidng  ID following  Continue rocephin  Continue to follow

## 2018-12-12 NOTE — PROGRESS NOTES
Hospital Medicine Daily Progress Note    Date of Service  12/12/2018    Chief Complaint  48 y.o. male admitted 12/11/2018 with pain    Hospital Course     Mr Serrano is a 48 y.o. male PMH of methamphetamine abuse, who presented with low back pain. The patient is somewhat poor historian.  He kept falling asleep during the interview. In the ER he was found to have hypotensive with systolic blood pressure around 70s.  He was given 1 L of IV fluid bolus and his systolic blood pressure go up to around 95.   He had a CT scan of renal done with no acute finding.  Also MRI of the lumbar spine was done only showing moderate spinal stenosis.      Interval Problem Update  He is reporting abdominal cramping, states he is constipated last bm 4 days ago, + flatus  Abdomen with mild distention but soft, had small bm after suppository, will give fleets  Denies sob, reports back pain improved currently 3/10, denies numbness or tingling  No cough  No h/a  Ros otherwise negative    Consultants/Specialty      Code Status  Full    Disposition  TBD    Review of Systems  Review of Systems   Constitutional: Negative.  Negative for chills, diaphoresis, fever and malaise/fatigue.   HENT: Negative.  Negative for sore throat.    Eyes: Negative.  Negative for blurred vision.   Respiratory: Negative.  Negative for cough and shortness of breath.    Cardiovascular: Negative.  Negative for chest pain, palpitations and leg swelling.   Gastrointestinal: Positive for abdominal pain and constipation. Negative for nausea and vomiting.   Genitourinary: Negative.  Negative for dysuria.   Musculoskeletal: Positive for back pain. Negative for myalgias.   Skin: Negative.  Negative for itching and rash.   Neurological: Negative.  Negative for dizziness, focal weakness, weakness and headaches.   Endo/Heme/Allergies: Negative.  Does not bruise/bleed easily.   Psychiatric/Behavioral: Positive for substance abuse. Negative for depression and suicidal ideas.   All  other systems reviewed and are negative.       Physical Exam  Temp:  [36.3 °C (97.3 °F)-36.9 °C (98.5 °F)] 36.9 °C (98.5 °F)  Pulse:  [57-77] 77  Resp:  [17-20] 18  BP: (116-158)/(59-93) 158/90    Physical Exam   Constitutional: He is oriented to person, place, and time. He appears well-developed and well-nourished. No distress.   HENT:   Head: Normocephalic and atraumatic.   Mouth/Throat: Oropharynx is clear and moist.   Eyes: Conjunctivae are normal. Right eye exhibits no discharge.   Cardiovascular: Normal rate, regular rhythm, normal heart sounds and intact distal pulses.  Exam reveals no gallop and no friction rub.    No murmur heard.  Pulmonary/Chest: Effort normal and breath sounds normal. No respiratory distress. He has no wheezes. He has no rales. He exhibits no tenderness.   Abdominal: Soft. Bowel sounds are normal. He exhibits no distension and no mass. There is no tenderness. There is no rebound and no guarding.   Mild distention but I cannot elicit any tenderness or pain with palpation, + bs, no cva tenderness   Musculoskeletal: Normal range of motion. He exhibits no edema, tenderness or deformity.   Neurological: He is alert and oriented to person, place, and time. He has normal reflexes. No cranial nerve deficit. He exhibits normal muscle tone. Coordination normal.   Skin: Skin is warm and dry. No rash noted. He is not diaphoretic. No erythema. No pallor.   Psychiatric: He has a normal mood and affect. His behavior is normal. Judgment and thought content normal.   Nursing note and vitals reviewed.      Fluids    Intake/Output Summary (Last 24 hours) at 12/12/18 1543  Last data filed at 12/12/18 0900   Gross per 24 hour   Intake             1820 ml   Output                0 ml   Net             1820 ml       Laboratory  Recent Labs      12/11/18   1137  12/12/18   0329   WBC  13.2*  18.1*   RBC  4.72  4.10*   HEMOGLOBIN  14.0  12.1*   HEMATOCRIT  41.6*  37.3*   MCV  88.1  91.0   MCH  29.7  29.5   MCHC   33.7  32.4*   RDW  43.4  45.9   PLATELETCT  233  177   MPV  9.4  9.5     Recent Labs      12/11/18   1137  12/12/18   0329   SODIUM  140  136   POTASSIUM  3.7  4.0   CHLORIDE  110  109   CO2  22  20   GLUCOSE  103*  104*   BUN  26*  22   CREATININE  1.44*  1.21   CALCIUM  8.4*  7.9*     Recent Labs      12/11/18   1137   APTT  29.8   INR  1.07               Imaging  MR-LUMBAR SPINE-WITH & W/O   Final Result      1.  Grade 1 spondylolysis of L5 on S1.   2.  Bilateral L5 spondylolysis.   3.  Moderate bilateral L5 neural foraminal stenosis.      CT-RENAL COLIC EVALUATION(A/P W/O)   Final Result      No evidence of renal, ureteral or bladder calculi. No hydronephrosis.      No acute intra-abdominal abnormality is seen.      Hepatic steatosis.      Small hypodense left renal lesions are too small to characterize. These may represent small cysts.      Patchy right lower lobe airspace opacities may represent atelectasis or pneumonitis.      Atherosclerotic plaque.      Bilateral pars defects at L5 with mild grade 1 anterolisthesis of L5 on S1.         DX-CHEST-PORTABLE (1 VIEW)   Final Result      No acute cardiopulmonary process is seen.           Assessment/Plan  Acute kidney injury (HCC)- (present on admission)   Assessment & Plan    Resolved  UA pending  Avoid nephrotoxic drugs       Hypotension- (present on admission)   Assessment & Plan    Resolved   Continue to follow  Continue IV fluids     Encephalopathy acute- (present on admission)   Assessment & Plan    Resolved  Suspect multifactorial etiology with intoxication or w/d contributed, sepsis could have also contributed         Abdominal pain   Assessment & Plan    Suspect multifactorial  Suspect pleuritic component from pneumonia  Constipation likely contributing  Query w/d?  Continue supportive care   Following closely   Will also check another troponin and lipase     Constipation   Assessment & Plan    See above  Continue bowel regimen     Pneumonia   Assessment  & Plan    Continue supportive care and empiric abx     Leukocytosis- (present on admission)   Assessment & Plan    Increased  Will check procalcitonin  Suspect related to pneumonia, continue abx and supportive care     Nicotine abuse- (present on admission)   Assessment & Plan    Cessation discussed and recommended          VTE prophylaxis: heparin

## 2018-12-13 PROBLEM — E87.6 HYPOKALEMIA: Status: ACTIVE | Noted: 2018-01-01

## 2018-12-13 PROBLEM — R78.81 BACTEREMIA: Status: ACTIVE | Noted: 2018-01-01

## 2018-12-13 NOTE — PROGRESS NOTES
[id number] [facility] Patient Status:  Observation   Age/Gender 33year old male MRN [id number]   [facility] SURGERY Attending Jonathon Anguiano MD   Hosp Day # 0 PCP PHYSICIAN NONSTAFF       Anesthesia Post-op Note    Procedure(s Handoff report received from day RN Sandra. Assumed patient care.  Pt is complaining of abdominal fullness and discomfort. Falls easily asleep during assessment. AAOx4. Bed alarm is on. Safety precautions in place. Call light and personal belongings within reach. Educated to call for assistance if needed.

## 2018-12-13 NOTE — PROGRESS NOTES
RN notified Dr. Hernandez that pt has not had a bowel movement after senna, miralax, MOM, dulcolax suppository, and milk of molasses enema given (pt refused prune juice). Pt still c/o of severe abdominal, chest and back pain. Per MD, to continue prn toradol, tylenol, and flexeril.

## 2018-12-13 NOTE — PROGRESS NOTES
Yue from Lab called with lab result of positive blood culture: gram positive coccy  In chains, possibly strep at 2246, more ID tomorrow. Lab result read back to Yue.   Dr. ronquillo notified of positive blood culture: gram positive coccy  In chains, possibly strep at 2246, more ID tomorrow. Lab result read back by Dr. Ronquillo.

## 2018-12-13 NOTE — ASSESSMENT & PLAN NOTE
See above  Possible endocarditis  H/o meth use  TIMA pending  Repeat b/c pending, 1/2 growing strep veridans

## 2018-12-13 NOTE — PROGRESS NOTES
Amarilis hospitalist. Patients /93 per CNA at 1940. Manual BP taken by RN at 1955 of 190/88. Dr. Quinones responded to the page. PRN antihypertensive ordered via telephone by MD with read back by RN.

## 2018-12-13 NOTE — PROGRESS NOTES
Hospital Medicine Daily Progress Note    Date of Service  12/13/2018    Chief Complaint  48 y.o. male admitted 12/11/2018 with pain    Hospital Course     Mr Serrano is a 48 y.o. male PMH of methamphetamine abuse, who presented with low back pain. The patient is somewhat poor historian.  He kept falling asleep during the interview. In the ER he was found to have hypotensive with systolic blood pressure around 70s.  He was given 1 L of IV fluid bolus and his systolic blood pressure go up to around 95.   He had a CT scan of renal done with no acute finding.  Also MRI of the lumbar spine was done only showing moderate spinal stenosis.      Interval Problem Update  He is markedly improved today  axox4  He reports that the abdominal pain resolved after a large bm  Denies back pain today, denies sob, mild cough  No dizziness or lightheadedness    Consultants/Specialty      Code Status  Full    Disposition  TBD    Review of Systems  Review of Systems   Constitutional: Negative.  Negative for chills, diaphoresis, fever and malaise/fatigue.   HENT: Negative.  Negative for sore throat.    Eyes: Negative.  Negative for blurred vision.   Respiratory: Negative.  Negative for cough and shortness of breath.    Cardiovascular: Negative.  Negative for chest pain, palpitations and leg swelling.   Gastrointestinal: Negative for abdominal pain, constipation, nausea and vomiting.   Genitourinary: Negative.  Negative for dysuria.   Musculoskeletal: Negative for back pain and myalgias.   Skin: Negative.  Negative for itching and rash.   Neurological: Negative.  Negative for dizziness, focal weakness, weakness and headaches.   Endo/Heme/Allergies: Negative.  Does not bruise/bleed easily.   Psychiatric/Behavioral: Positive for substance abuse. Negative for depression and suicidal ideas.   All other systems reviewed and are negative.       Physical Exam  Temp:  [36.4 °C (97.6 °F)-37.3 °C (99.2 °F)] 36.4 °C (97.6 °F)  Pulse:  [71-97] 94  Resp:   [17-20] 18  BP: (145-190)/() 156/75    Physical Exam   Constitutional: He is oriented to person, place, and time. He appears well-developed and well-nourished. No distress.   HENT:   Head: Normocephalic and atraumatic.   Mouth/Throat: Oropharynx is clear and moist.   Eyes: Conjunctivae are normal. Right eye exhibits no discharge.   Cardiovascular: Normal rate, regular rhythm, normal heart sounds and intact distal pulses.  Exam reveals no gallop and no friction rub.    No murmur heard.  Pulmonary/Chest: Effort normal and breath sounds normal. No respiratory distress. He has no wheezes. He has no rales. He exhibits no tenderness.   Abdominal: Soft. Bowel sounds are normal. He exhibits no distension and no mass. There is no tenderness. There is no rebound and no guarding.   Musculoskeletal: Normal range of motion. He exhibits no edema, tenderness or deformity.   Neurological: He is alert and oriented to person, place, and time. He has normal reflexes. No cranial nerve deficit. He exhibits normal muscle tone. Coordination normal.   Skin: Skin is warm and dry. No rash noted. He is not diaphoretic. No erythema. No pallor.   Psychiatric: He has a normal mood and affect. His behavior is normal. Judgment and thought content normal.   Nursing note and vitals reviewed.      Fluids    Intake/Output Summary (Last 24 hours) at 12/13/18 1552  Last data filed at 12/13/18 0400   Gross per 24 hour   Intake             1720 ml   Output              900 ml   Net              820 ml       Laboratory  Recent Labs      12/11/18   1137  12/12/18   0329  12/13/18   0238   WBC  13.2*  18.1*  19.0*   RBC  4.72  4.10*  4.45*   HEMOGLOBIN  14.0  12.1*  13.1*   HEMATOCRIT  41.6*  37.3*  38.9*   MCV  88.1  91.0  87.4   MCH  29.7  29.5  29.4   MCHC  33.7  32.4*  33.7   RDW  43.4  45.9  42.6   PLATELETCT  233  177  175   MPV  9.4  9.5  9.8     Recent Labs      12/11/18   1137  12/12/18   0329  12/13/18   0238   SODIUM  140  136  137    POTASSIUM  3.7  4.0  3.5*   CHLORIDE  110  109  110   CO2  22  20  19*   GLUCOSE  103*  104*  97   BUN  26*  22  9   CREATININE  1.44*  1.21  0.73   CALCIUM  8.4*  7.9*  8.1*     Recent Labs      12/11/18   1137   APTT  29.8   INR  1.07               Imaging  MR-LUMBAR SPINE-WITH & W/O   Final Result      1.  Grade 1 spondylolysis of L5 on S1.   2.  Bilateral L5 spondylolysis.   3.  Moderate bilateral L5 neural foraminal stenosis.      CT-RENAL COLIC EVALUATION(A/P W/O)   Final Result      No evidence of renal, ureteral or bladder calculi. No hydronephrosis.      No acute intra-abdominal abnormality is seen.      Hepatic steatosis.      Small hypodense left renal lesions are too small to characterize. These may represent small cysts.      Patchy right lower lobe airspace opacities may represent atelectasis or pneumonitis.      Atherosclerotic plaque.      Bilateral pars defects at L5 with mild grade 1 anterolisthesis of L5 on S1.         DX-CHEST-PORTABLE (1 VIEW)   Final Result      No acute cardiopulmonary process is seen.      EC-ECHOCARDIOGRAM COMPLETE W/O CONT    (Results Pending)        Assessment/Plan  Acute kidney injury (HCC)- (present on admission)   Assessment & Plan    Resolved  Avoid nephrotoxic drugs       Hypotension- (present on admission)   Assessment & Plan    Resolved   Continue to follow  Continue IV fluids     Encephalopathy acute- (present on admission)   Assessment & Plan    Resolved  Suspect multifactorial etiology with intoxication or w/d contributed, sepsis could have also contributed         Hypokalemia   Assessment & Plan    Mild  replacing     Bacteremia   Assessment & Plan    Prelim cultures  Repeat pending  No murmur  Will check echo  Pneumonitis may be contributing  following     Abdominal pain   Assessment & Plan    Resolved  Suspected due to constipation     Constipation   Assessment & Plan    See above  resolved  Continue bowel regimen     Pneumonia   Assessment & Plan     Continue supportive care and empiric abx  Suspected pneumonitis, noted on CT     Leukocytosis- (present on admission)   Assessment & Plan    Increased  Will check procalcitonin  Suspect related to pneumonia, continue abx and supportive care     Nicotine abuse- (present on admission)   Assessment & Plan    Cessation discussed and recommended          VTE prophylaxis: heparin

## 2018-12-13 NOTE — PROGRESS NOTES
Received bedside report from RN, pt care assumed, VSS, pt assessment complete. Pt AAOx4, no c/o  pain at this time. No signs of acute distress noted at this time. POC discussed with pt and verbalizes no questions. Pt denies any additional needs at this time. Bed in lowest position, bed alarm off, pt educated on fall risk and verbalized understanding, call light within reach, hourly rounding initiated. In a sinus rhythm. Patient is refusing bed alarm.

## 2018-12-13 NOTE — CARE PLAN
Problem: Safety  Goal: Will remain free from injury  Outcome: PROGRESSING AS EXPECTED  Patient's risk for injury and falls assessed. Appropriate safety precautions in place. Patient educated to utilize call light for needs. Patient verbalizes understanding.

## 2018-12-14 NOTE — PROGRESS NOTES
Received bedside report from RN, pt care assumed, VSS, pt assessment complete. Pt AAOx4,chronic c/o  pain at this time. No signs of acute distress noted at this time. POC discussed with pt and verbalizes no questions. Pt denies any additional needs at this time. Bed in lowest position, bed alarm off, pt educated on fall risk and verbalized understanding, call light within reach, hourly rounding initiated. In a sinus rhythm. Patient is refusing bed alarm and assistance with ADL's.

## 2018-12-14 NOTE — PROGRESS NOTES
Handoff report received from dayshift NIKOLAY [place]. Assumed patient care.  Pt is eating dinner, AAOx4. Discussed POC and PT verbalizes understanding. Safety precautions in place. Call light and personal belongings within reach. Educated to call for assistance if needed.

## 2018-12-14 NOTE — PROGRESS NOTES
Pt resting in bed at this time, eyes closed, breathing even and unlabored.  No signs of distress. Call light in place. Bed in low and locked position. Hourly rounding in place.

## 2018-12-14 NOTE — CONSULTS
Consults   INFECTIOUS DISEASES INPATIENT CONSULT NOTE     Date of Service: 12/14/2018    Consult Requested By: Daily Hernandez M.D.    Reason for Consultation: Viridens streptococcus bacteremia    History of Present Illness:   Trevor Serrano is a 48 y.o. male admitted 12/11/2018. Pt has a past medical history of methamphetamine abuse and urine drug screen this admission positive for amphetamines, opiates, and cannabinoids.. He presented to the ER complaining of low back pain.  He was afebrile but was found to be hypotensive which responded to 1 L IV fluid.  Leukocytosis of 13.2. He was also noted to have acute kidney injury.  Chest x-ray with no indication of pneumonia. CT and MRI of the lumbar spine were unrevealing.  CT renal evaluation with no acute concerning findings but did report patchy right lung lower airspace opacities. Blood cultures were obtained with growth of viridans Streptococcus 1/2 sets.  Repeat cultures on 1/13 so far no growth.  He was initially started on Unasyn, in addition to ceftriaxone on 12/13.     Today patient with new fevers to 101.4, tachycardia.  He is satting well on room air.  TTE completed today.  Cannot rule out vegetation on aortic valve.  He has increasing leukocytosis.  He is somnolent during my exam and keeps falling asleep.  Denies pain, eyes cough or shortness of breath, denies abdominal pain nausea vomiting or diarrhea.  He endorses inhaling crystal meth but denies any IV drug use.     Review Of Systems:  All other ROS were reviewed and are negative except as noted above in the HPI.    PMH:   No past medical history on file.     PSH:  No past surgical history on file.    FAMILY HX:  No family history on file.  Denies any relevant family history.    SOCIAL HX:  Social History     Social History   • Marital status: Single     Spouse name: N/A   • Number of children: N/A   • Years of education: N/A     Occupational History   • Not on file.     Social History Main Topics   • Smoking  status: Current Every Day Smoker     Packs/day: 0.50     Years: 35.00     Types: Cigarettes     Start date: 12/12/1983   • Smokeless tobacco: Not on file   • Alcohol use No   • Drug use: Yes     Types: Inhaled      Comment: methamphetamines, marijuana,    • Sexual activity: Not on file     Other Topics Concern   • Not on file     Social History Narrative   • No narrative on file     History   Smoking Status   • Current Every Day Smoker   • Packs/day: 0.50   • Years: 35.00   • Types: Cigarettes   • Start date: 12/12/1983   Smokeless Tobacco   • Not on file     History   Alcohol Use No       Allergies/Intolerances:  No Known Allergies    History reviewed with the patient .     Other Current Medications:    Current Facility-Administered Medications:   •  cefTRIAXone (ROCEPHIN) 2 g in  mL IVPB, 2 g, Intravenous, Q24HRS, Daily Hernandez M.D., Stopped at 12/14/18 0554  •  ketorolac (TORADOL) injection 15 mg, 15 mg, Intravenous, Q6HRS PRN, Daily Hernandez M.D., 15 mg at 12/14/18 0048  •  bisacodyl (DULCOLAX) suppository 10 mg, 10 mg, Rectal, DAILY, Daily Hernandez M.D., Stopped at 12/14/18 0600  •  hydrALAZINE (APRESOLINE) injection 10-20 mg, 10-20 mg, Intravenous, Q4HRS PRN, Hector Quinones DBRI, 20 mg at 12/14/18 0230  •  senna-docusate (PERICOLACE or SENOKOT S) 8.6-50 MG per tablet 2 Tab, 2 Tab, Oral, BID, 2 Tab at 12/14/18 0524 **AND** polyethylene glycol/lytes (MIRALAX) PACKET 1 Packet, 1 Packet, Oral, QDAY PRN, 1 Packet at 12/12/18 0931 **AND** magnesium hydroxide (MILK OF MAGNESIA) suspension 30 mL, 30 mL, Oral, QDAY PRN, 30 mL at 12/12/18 1358 **AND** bisacodyl (DULCOLAX) suppository 10 mg, 10 mg, Rectal, QDAY PRN, Janak Iniguez M.D.  •  NS infusion, , Intravenous, Continuous, Janak Iniguez M.D., Last Rate: 125 mL/hr at 12/13/18 0510  •  heparin injection 5,000 Units, 5,000 Units, Subcutaneous, Q8HRS, Janak Iniguez M.D., 5,000 Units at 12/13/18 0503  •  ondansetron (ZOFRAN) syringe/vial injection 4 mg, 4 mg,  "Intravenous, Q4HRS PRN, Janak Iniguez M.D.  •  ondansetron (ZOFRAN ODT) dispertab 4 mg, 4 mg, Oral, Q4HRS PRN, Janak Iniguez M.D.  •  promethazine (PHENERGAN) tablet 12.5-25 mg, 12.5-25 mg, Oral, Q4HRS PRN, Janak Iniguez M.D.  •  promethazine (PHENERGAN) suppository 12.5-25 mg, 12.5-25 mg, Rectal, Q4HRS PRN, Janak Iniguez M.D.  •  prochlorperazine (COMPAZINE) injection 5-10 mg, 5-10 mg, Intravenous, Q4HRS PRN, Janak Iniguez M.D., 10 mg at 18 0230  •  acetaminophen (TYLENOL) tablet 650 mg, 650 mg, Oral, Q6HRS PRN, Janak Iniguez M.D., 650 mg at 18 0400  •  cyclobenzaprine (FLEXERIL) tablet 10 mg, 10 mg, Oral, TID PRN, Janak Iniguez M.D., 10 mg at 18  [unfilled]    Most Recent Vital Signs:  /100   Pulse 99   Temp 37.6 °C (99.6 °F) (Temporal)   Resp 20   Ht 1.676 m (5' 6\")   Wt 85.5 kg (188 lb 7.9 oz)   SpO2 97%   BMI 30.42 kg/m²   Temp  Av.9 °C (98.4 °F)  Min: 35.6 °C (96.1 °F)  Max: 38.6 °C (101.4 °F)    Physical Exam:  General: well-appearing, no acute distress,  HEENT: sclera anicteric, PERRL, EOMI, MMM, no oral lesions  Neck: supple, no lymphadenopathy  Chest: CTAB, no r/r/w, normal work of breathing.  Cardiac: RRR, normal S1 S2, murmur  Abdomen: + bowel sounds, soft, non-tender, non-distended, no HSM  Extremities: WWP, no edema, 2+ pulses  Skin: no rashes or worrisome lesions, numerous tattoos  Neuro: Alert and oriented times 3, non-focal exam, speech fluent, moves all extremities, somnolent    Pertinent Lab Results:  Recent Labs      18   WBC  18.1*  19.0*  22.5*      Recent Labs      18   HEMOGLOBIN  12.1*  13.1*  14.0   HEMATOCRIT  37.3*  38.9*  41.0*   MCV  91.0  87.4  85.6   MCH  29.5  29.4  29.2   PLATELETCT  177  175  196         Recent Labs      18   SODIUM  136  137  134*   POTASSIUM  4.0  3.5*  3.3*   CHLORIDE  109  110  106   CO2 " " 20  19*  18*   CREATININE  1.21  0.73  0.77        Recent Labs      12/11/18   1137  12/12/18   0329  12/13/18   0238   ALBUMIN  3.5  3.1*  3.4        Pertinent Micro:  Results     Procedure Component Value Units Date/Time    BLOOD CULTURE [[id number]] Collected:  12/14/18 0935    Order Status:  Completed Specimen:  Blood from Peripheral Updated:  12/14/18 0952    Narrative:       Per Hospital Policy: Only change Specimen Src: to \"Line\" if  specified by physician order.    BLOOD CULTURE [[id number]] Collected:  12/14/18 0935    Order Status:  Completed Specimen:  Blood from Peripheral Updated:  12/14/18 0952    Narrative:       Per Hospital Policy: Only change Specimen Src: to \"Line\" if  specified by physician order.    BLOOD CULTURE [[id number]]  (Abnormal) Collected:  12/11/18 1800    Order Status:  Completed Specimen:  Blood from Peripheral Updated:  12/14/18 0845     Significant Indicator POS (POS)     Source BLD     Site PERIPHERAL     Blood Culture Growth detected by Bactec instrument. 12/12/2018  22:45 (A)      Viridans streptococcus - possible contaminant  Isolated from one bottle only, possible skin contaminant  please correlate with clinical condition.   (A)    Narrative:       CALL  Simental  171 tel. [phone],  CALLED  171 tel. [phone] 12/12/2018, 22:47, RB PERF. RESULTS CALLED  TO:[phone].  Per Hospital Policy: Only change Specimen Src: to \"Line\" if  specified by physician order.    Blood Culture [[id number]] Collected:  12/13/18 1459    Order Status:  Completed Specimen:  Blood from Peripheral Updated:  12/14/18 0835     Significant Indicator NEG     Source BLD     Site PERIPHERAL     Blood Culture No Growth    Note: Blood cultures are incubated for 5 days and  are monitored continuously.Positive blood cultures  are called to the RN and reported as soon as  they are identified.      Narrative:       Per Hospital Policy: Only change Specimen Src: to \"Line\" if  specified by physician order.    URINALYSIS " "[[id number]]  (Abnormal) Collected:  12/13/18 0336    Order Status:  Completed Specimen:  Urine from Urine, Clean Catch Updated:  12/13/18 0348     Color Yellow     Character Clear     Specific Gravity 1.009     Ph 6.5     Glucose Negative mg/dL      Ketones 40 (A) mg/dL      Protein Negative mg/dL      Bilirubin Negative     Urobilinogen, Urine 0.2     Nitrite Negative     Leukocyte Esterase Negative     Occult Blood Negative     Micro Urine Req see below     Comment: Microscopic examination not performed when specimen is clear  and chemically negative for protein, blood, leukocyte esterase  and nitrite.         Narrative:       Collected By:[id number] OLIMPIA VARELA    BLOOD CULTURE [[id number]] Collected:  12/11/18 1510    Order Status:  Completed Specimen:  Blood from Peripheral Updated:  12/12/18 0828     Significant Indicator NEG     Source BLD     Site PERIPHERAL     Blood Culture No Growth    Note: Blood cultures are incubated for 5 days and  are monitored continuously.Positive blood cultures  are called to the RN and reported as soon as  they are identified.      Narrative:       Per Hospital Policy: Only change Specimen Src: to \"Line\" if  specified by physician order.    Urinalysis, culture if indicated [[id number]] Collected:  12/11/18 2345    Order Status:  Completed Specimen:  Urine Updated:  12/12/18 0023     Micro Urine Req see below     Comment: Microscopic examination not performed when specimen is clear  and chemically negative for protein, blood, leukocyte esterase  and nitrite.          Color Yellow     Character Clear     Specific Gravity >=1.030     Ph 5.5     Glucose Negative mg/dL      Ketones Negative mg/dL      Protein Negative mg/dL      Bilirubin Negative     Urobilinogen, Urine 0.2     Nitrite Negative     Leukocyte Esterase Negative     Occult Blood Negative    URINALYSIS [[id number]] Collected:  12/11/18 0000    Order Status:  Canceled Specimen:  Urine from Urine, Clean Catch         Blood " Culture   Date Value Ref Range Status   12/13/2018   Preliminary    No Growth    Note: Blood cultures are incubated for 5 days and  are monitored continuously.Positive blood cultures  are called to the RN and reported as soon as  they are identified.          Studies:  Ct-renal Colic Evaluation(a/p W/o)    Result Date: 12/11/2018 12/11/2018 1:14 PM HISTORY/REASON FOR EXAM: Back pain. TECHNIQUE/EXAM DESCRIPTION AND NUMBER OF VIEWS: CT scan renal/colic without contrast. Helical images of the abdomen and pelvis were obtained from the diaphragmatic domes through the pubic symphysis. Low dose optimization technique was utilized for this CT exam including automated exposure control and adjustment of the mA and/or kV according to patient size. COMPARISON: None. FINDINGS: Evaluation of parenchymal and vascular structures is limited by the lack of intravenous contrast. Lung bases: There is patchy right lower lobe airspace opacity. There is mild lingula and left lower lobe atelectasis. Examination is limited by patient motion. Abdomen: No free air is seen in the abdomen or pelvis. The liver is hypodense compatible with hepatic steatosis. There is fatty sparing adjacent to the gallbladder. Gallbladder appears unremarkable. Spleen is not enlarged. No adrenal mass is identified. There is no evidence of hydronephrosis. Small hypodense left renal lesions are too small to characterize. Pancreas appears unremarkable. Aorta is not aneurysmal. There is atherosclerotic plaque. There are small inguinal, retroperitoneal and mesenteric lymph nodes. There is no evidence of bowel obstruction. Terminal ileum is unremarkable. There is no evidence of acute appendicitis. Bladder is moderately distended. Calcific densities in the pelvis likely represent phleboliths. . No free fluid is seen in the abdomen or pelvis. Degenerative changes are seen in the spine. Sclerotic foci in the pelvis likely represent bone metastases. There are pars defects  at L5 bilaterally with mild grade 1 anterolisthesis of L5 on S1.     No evidence of renal, ureteral or bladder calculi. No hydronephrosis. No acute intra-abdominal abnormality is seen. Hepatic steatosis. Small hypodense left renal lesions are too small to characterize. These may represent small cysts. Patchy right lower lobe airspace opacities may represent atelectasis or pneumonitis. Atherosclerotic plaque. Bilateral pars defects at L5 with mild grade 1 anterolisthesis of L5 on S1.     Dx-chest-portable (1 View)    Result Date: 12/11/2018 12/11/2018 11:19 AM HISTORY/REASON FOR EXAM:  Chest Pain. TECHNIQUE/EXAM DESCRIPTION AND NUMBER OF VIEWS: Single portable view of the chest. COMPARISON: None FINDINGS: The heart is not enlarged. No focal consolidation, pleural effusion or pneumothorax is identified.  Costophrenic angles are clear.     No acute cardiopulmonary process is seen.    Mr-lumbar Spine-with & W/o    Result Date: 12/11/2018 12/11/2018 4:24 PM HISTORY/REASON FOR EXAM:  Back pain, rapidly progressive neuro deficit. TECHNIQUE/EXAM DESCRIPTION: MRI of the lumbar spine without and with contrast. The study was performed on a Face to Face Livea 1.5 Geri MRI scanner. T1 sagittal, T2 fast spin-echo sagittal, and T2 axial images were obtained of the lumbar spine. T1 post-contrast fat-suppressed sagittal images were obtained. 8 mL Gadavist contrast was administered intravenously. COMPARISON:  None. FINDINGS: There is grade 1 spondylolysis of L5 on S1. There is bilateral L5 spondylolysis. At the level of L5-S1, there is diffuse disc bulge. There is no central canal stenosis. There is moderate bilateral neural foraminal stenosis. At the level of L4-5, there is no spinal or neural foraminal stenosis. At the level of L3-4, there is no spinal or neural foraminal stenosis. At the level of L2-3, there is no spinal or neural foraminal stenosis. At the level of L1-2, there is no spinal or neural foraminal stenosis. The conus  terminates at the level of L1. The visualized lower thoracic spinal cord appear normal. There is no lumbar intradural lesion. The visualized pre-and paraspinal soft tissues appear normal. There is no abnormal contrast enhancement.     1.  Grade 1 spondylolysis of L5 on S1. 2.  Bilateral L5 spondylolysis. 3.  Moderate bilateral L5 neural foraminal stenosis.    Ec-echocardiogram Complete W/o Cont    Result Date: 2018  Transthoracic Echo Report Echocardiography Laboratory CONCLUSIONS No prior study is available for comparison. Normal left ventricular systolic function. Cannot rule out vegetation of the right coronary cusp of the aortic valve. Cannot rule out bicuspid aortic valve. No aortic stenosis. Moderate aortic insufficiency. Pressure half time is 275 msec. Ascending aorta is dilated with a diameter of 4.5 cm. If needed further clarification, a transesophageal echocardiogram might be needed. KYRA GARCIA Exam Date:         2018                    07:52 Exam Location:     Inpatient Priority:          Routine Ordering Physician:        OLEKSANDR HUMPHRIES Referring Physician:       KRISTEL Ospina Sonographer:               Noman Lemus Age:    48     Gender:    M MRN:    [id number] :    1970 BSA:    1.99   Ht (in):    66     Wt (lb):    197 Exam Type:     Complete Indications:     Endocarditis and heart valve disorders in diseases                  classified elsewhere ICD Codes:       I39 CPT Codes:       73497 BP:   156    /   75     HR: Technical Quality:       Fair MEASUREMENTS  (Male / Female) Normal Values 2D ECHO LV Diastolic Diameter PLAX        3.7 cm                4.2 - 5.9 / 3.9 - 5.3 cm LV Systolic Diameter PLAX         2 cm                  2.1 - 4.0 cm IVS Diastolic Thickness           1 cm                  LVPW Diastolic Thickness          0.97 cm               LVOT Diameter                     2.1 cm                Estimated LV Ejection Fraction    65 %                  LV Ejection  Fraction MOD BP       69.5 %                >= 55  % LV Ejection Fraction MOD 4C       70.6 %                LV Ejection Fraction MOD 2C       71.9 %                IVC Diameter                      2 cm                  DOPPLER AV Peak Velocity                  1.2 m/s               AV Peak Gradient                  5.6 mmHg              AV Mean Gradient                  3.8 mmHg              AI Pressure Half Time             287 ms                LVOT Peak Velocity                1.1 m/s               AV Area Cont Eq vti               3.1 cm²               MV Velocity Time Integral         32.1 cm               Mitral E Point Velocity           1.1 m/s               Mitral E to A Ratio               1.1                   MV Pressure Half Time             34.4 ms               MV Area PHT                       6.4 cm²               MV Deceleration Time              119 ms                PV Peak Velocity                  1 m/s                 PV Peak Gradient                  4.3 mmHg              RVOT Peak Velocity                0.8 m/s               * Indicates values subject to auto-interpretation LV EF:  65    % FINDINGS Left Ventricle Normal left ventricular chamber size. Normal left ventricular wall thickness. Normal left ventricular systolic function. Left ventricular ejection fraction is visually estimated to be 65%. Normal regional wall motion. Normal diastolic function. Right Ventricle The right ventricle was normal in size and function. Right Atrium The right atrium is normal in size.  Normal inferior vena cava size and inspiratory collapse. Left Atrium The left atrium is normal in size.  Left atrial volume index is 23 mL/sq m. Mitral Valve Structurally normal mitral valve. No mitral stenosis. Trace mitral regurgitation. Aortic Valve Can not rule out vegetation of the right coronary cusp of the aortic valve. No aortic stenosis. Moderate aortic insufficiency. Pressure half time is 275 msec. Tricuspid  Valve Structurally normal tricuspid valve. No tricuspid stenosis. Trace tricuspid regurgitation. Unable to estimate pulmonary artery pressure due to an inadequate tricuspid regurgitant jet. Pulmonic Valve Structurally normal pulmonic valve. No pulmonic stenosis. No pulmonic insufficiency. Pericardium Trivial pericardial effusion. Aorta Ascending aorta is dilated with a diameter of 4.5 cm. Izzyayannaarina CARTY To (Electronically Signed) Final Date:     14 December 2018                 09:55      ASSESSMENT:     48 y.o. male admitted 12/11/2018. Pt has a past medical history of methamphetamine abuse and urine drug screen this admission positive for amphetamines, opiates, and cannabinoids.. He presented to the ER complaining of low back pain.  He was afebrile but was found to be hypotensive which responded to IVF.  Leukocytosis of 13.2. He was also noted to have acute kidney injury.  Chest x-ray with no indication of pneumonia. CT and MRI of the lumbar spine were unrevealing.  CT renal evaluation with no acute concerning findings but did report patchy right lung lower airspace opacities. Blood cultures were obtained with growth of viridans Streptococcus 1/2 sets.  Repeat cultures on 1/13 so far no growth.  He was initially started on Unasyn, in addition to ceftriaxone on 12/13.     Today patient with new fevers to 101.4, tachycardia.  He is satting well on room air.  TTE completed today.  Cannot rule out vegetation on aortic valve.  He also has increasing leukocytosis.       Bacteremia, Virdens strep  -Unknown source but patient methamphetamine user likely poor oral care, denies IV drug use, overall poor historian  -MRI with no indication of discitis or abscess    Native aortic valve infective endocarditis-cannot rule out per TTE on 12/14    Right lung opacities on CT A/P  -Concern for pneumonia    Fevers, 2/2 above    Leukocytosis, 2/2 above    Methamphetamine and opiate abuse      PLAN:     Continue ceftriaxone 2 g q. 24    Please obtain TIMA  Obtain CT chest to better evaluate for pneumonia  Repeat blood cultures 2 sets - ordered   HIV testing - ordered  Hepatitis screening - ordered       Plan of care discussed with KEVEN Hernandez M.D.. Will continue to follow    Claudia Calix M.D.

## 2018-12-14 NOTE — PROGRESS NOTES
"Patient has reported less discomfort to abd after having 3 medium sized brown bowel movements. He is holding off on taking PNX and flu shots for now. \" I will let you know when I want to take them\".  "

## 2018-12-14 NOTE — THERAPY
"Speech Language Therapy dysphagia treatment completed.   Functional Status:  Patient seen with his regular/thins breakfast tray on this date.  He was pleasant and agreeable and stated he is tolerating the current diet well.  Patient fed himself independently but took large bites and sequential large, rapid, gulps of thins.  He stated this was his baseline.  Patient demonstrated functional mastication and bolus manipulation, timely initiation of swallow trigger and laryngeal elevation and excursion was palpated as complete.  The patient maintained clear vocal quality throughout the session and had no overt s/sx of aspiration with any consistency consumed.   Recommendations: Continue regular/thins as tolerated.  No further acute SLP needs at this time.     Plan of Care: Patient with no further skilled SLP needs in the acute care setting at this time  Post-Acute Therapy: Anticipate that the patient will have no further speech therapy needs after discharge from the hospital.      See \"Rehab Therapy-Acute\" Patient Summary Report for complete documentation.     "

## 2018-12-14 NOTE — CARE PLAN
Problem: Venous Thromboembolism (VTW)/Deep Vein Thrombosis (DVT) Prevention:  Goal: Patient will participate in Venous Thrombosis (VTE)/Deep Vein Thrombosis (DVT)Prevention Measures  Outcome: PROGRESSING AS EXPECTED  Patient educated on DVT risks. Patient VS are stable. No signs of DVT. Pt verbalizes understanding of prescribed ambulation goals.Will continue to monitor for change status.    Problem: Bowel/Gastric:  Goal: Normal bowel function is maintained or improved  Outcome: PROGRESSING AS EXPECTED  Patient bowel function is assessed. Education provided on diet, fluid intake and activities that promote bowel function. Toileting at scheduled intervals in place for patient. Patient verbalizes understanding.

## 2018-12-14 NOTE — PROGRESS NOTES
Paged the hospitalist. Dr. Quinones returned the call. Explained that this patient has been hypertensive throughout the night, last BP of 175/78 after hydralazine administered. Telephone order with readback placed. Will continue to monitor the patient.

## 2018-12-15 NOTE — PROGRESS NOTES
Hospital Medicine Daily Progress Note    Date of Service  12/14/2018    Chief Complaint  48 y.o. male admitted 12/11/2018 with pain    Hospital Course     Mr Serrano is a 48 y.o. male PMH of methamphetamine abuse, who presented with low back pain. The patient is somewhat poor historian.  He kept falling asleep during the interview. In the ER he was found to have hypotensive with systolic blood pressure around 70s.  He was given 1 L of IV fluid bolus and his systolic blood pressure go up to around 95.   He had a CT scan of renal done with no acute finding.  Also MRI of the lumbar spine was done only showing moderate spinal stenosis.      Interval Problem Update  Denies pain to me no abdominal pain, no back pain  Did have high fever  Denies sob, no cough  ROS otherwise negative  Did request nicotine replacement    Consultants/Specialty  ID  Cardiology    Code Status  Full    Disposition  TBD    Review of Systems  Review of Systems   Constitutional: Negative.  Negative for chills, diaphoresis, fever and malaise/fatigue.   HENT: Negative.  Negative for sore throat.    Eyes: Negative.  Negative for blurred vision.   Respiratory: Negative.  Negative for cough and shortness of breath.    Cardiovascular: Negative.  Negative for chest pain, palpitations and leg swelling.   Gastrointestinal: Negative for abdominal pain, constipation, nausea and vomiting.   Genitourinary: Negative.  Negative for dysuria.   Musculoskeletal: Negative for back pain and myalgias.   Skin: Negative.  Negative for itching and rash.   Neurological: Negative.  Negative for dizziness, focal weakness, weakness and headaches.   Endo/Heme/Allergies: Negative.  Does not bruise/bleed easily.   Psychiatric/Behavioral: Positive for substance abuse. Negative for depression and suicidal ideas.   All other systems reviewed and are negative.       Physical Exam  Temp:  [36.9 °C (98.5 °F)-38.6 °C (101.4 °F)] 37.4 °C (99.4 °F)  Pulse:  [] 95  Resp:  [18-22]  18  BP: (137-177)/() 164/101    Physical Exam   Constitutional: He is oriented to person, place, and time. He appears well-developed and well-nourished. No distress.   HENT:   Head: Normocephalic and atraumatic.   Mouth/Throat: Oropharynx is clear and moist.   Eyes: Conjunctivae are normal. Right eye exhibits no discharge.   Cardiovascular: Normal rate, regular rhythm, normal heart sounds and intact distal pulses.  Exam reveals no gallop and no friction rub.    No murmur heard.  Pulmonary/Chest: Effort normal and breath sounds normal. No respiratory distress. He has no wheezes. He has no rales. He exhibits no tenderness.   Abdominal: Soft. Bowel sounds are normal. He exhibits no distension and no mass. There is no tenderness. There is no rebound and no guarding.   Musculoskeletal: Normal range of motion. He exhibits no edema, tenderness or deformity.   Neurological: He is alert and oriented to person, place, and time. He has normal reflexes. No cranial nerve deficit. He exhibits normal muscle tone. Coordination normal.   Skin: Skin is warm and dry. No rash noted. He is not diaphoretic. No erythema. No pallor.   Psychiatric: He has a normal mood and affect. His behavior is normal. Judgment and thought content normal.   Nursing note and vitals reviewed.      Fluids    Intake/Output Summary (Last 24 hours) at 12/14/18 1623  Last data filed at 12/14/18 0900   Gross per 24 hour   Intake              360 ml   Output                0 ml   Net              360 ml       Laboratory  Recent Labs      12/12/18   0329  12/13/18   0238  12/14/18   0319   WBC  18.1*  19.0*  22.5*   RBC  4.10*  4.45*  4.79   HEMOGLOBIN  12.1*  13.1*  14.0   HEMATOCRIT  37.3*  38.9*  41.0*   MCV  91.0  87.4  85.6   MCH  29.5  29.4  29.2   MCHC  32.4*  33.7  34.1   RDW  45.9  42.6  42.4   PLATELETCT  177  175  196   MPV  9.5  9.8  10.5     Recent Labs      12/12/18   0329  12/13/18   0238  12/14/18   0319   SODIUM  136  137  134*   POTASSIUM   4.0  3.5*  3.3*   CHLORIDE  109  110  106   CO2  20  19*  18*   GLUCOSE  104*  97  110*   BUN  22  9  8   CREATININE  1.21  0.73  0.77   CALCIUM  7.9*  8.1*  8.6                   Imaging  EC-ECHOCARDIOGRAM COMPLETE W/O CONT   Final Result      MR-LUMBAR SPINE-WITH & W/O   Final Result      1.  Grade 1 spondylolysis of L5 on S1.   2.  Bilateral L5 spondylolysis.   3.  Moderate bilateral L5 neural foraminal stenosis.      CT-RENAL COLIC EVALUATION(A/P W/O)   Final Result      No evidence of renal, ureteral or bladder calculi. No hydronephrosis.      No acute intra-abdominal abnormality is seen.      Hepatic steatosis.      Small hypodense left renal lesions are too small to characterize. These may represent small cysts.      Patchy right lower lobe airspace opacities may represent atelectasis or pneumonitis.      Atherosclerotic plaque.      Bilateral pars defects at L5 with mild grade 1 anterolisthesis of L5 on S1.         DX-CHEST-PORTABLE (1 VIEW)   Final Result      No acute cardiopulmonary process is seen.      CT-CHEST (THORAX) W/O    (Results Pending)        Assessment/Plan  Acute kidney injury (HCC)- (present on admission)   Assessment & Plan    Resolved  Avoid nephrotoxic drugs       Hypotension- (present on admission)   Assessment & Plan    Resolved   Continue to follow  Continue IV fluids     Encephalopathy acute- (present on admission)   Assessment & Plan    Resolved  Suspect multifactorial etiology with intoxication or w/d contributed, sepsis could have also contributed         Hypokalemia   Assessment & Plan    Continue replacement  Will check mag     Bacteremia   Assessment & Plan    See above  Possible endocarditis  H/o meth use, hiv also pending     Abdominal pain   Assessment & Plan    Resolved  Suspected due to constipation     Constipation   Assessment & Plan    See above  resolved  Continue bowel regimen     Pneumonia   Assessment & Plan    Continue supportive care and empiric abx  See above      Leukocytosis- (present on admission)   Assessment & Plan    Continues to increase with fever  Possible vegetation on echo, suspected endocarditis  Cardiology consulted for TIMA  CT chest to better eval possible pna  ID following  Continue rocephin     Nicotine abuse- (present on admission)   Assessment & Plan    Cessation discussed and recommended  Replacement ordered          VTE prophylaxis: heparin

## 2018-12-15 NOTE — PROGRESS NOTES
Received patient from night shift nurse. Assessment complete. A&Ox4. Pt denies pain at this time. Call light within reach. All needs attended to at this time. Bed in low position, treaded slippers on feet.

## 2018-12-15 NOTE — PROGRESS NOTES
Infectious Disease Progress Note    Author: Claudia Calix M.D. Date & Time of service: 12/15/2018  10:08 AM    Chief Complaint:  Viridens streptococcus bacteremia    Interval History:  12/15 AF, hypertensive , O2 RA, Labs and micro results reviewed. Imaging reviewed. Medications reviewed.  Leukocytosis continues.  Repeat blood cultures remain negative. CT chest with focal GGO and right upper lobe.       Review of Systems:  Review of Systems   Constitutional: Negative for chills, fever and malaise/fatigue.   Respiratory: Positive for cough. Negative for sputum production and shortness of breath.    Cardiovascular: Negative for chest pain.   Gastrointestinal: Negative for abdominal pain, diarrhea, nausea and vomiting.   Musculoskeletal: Positive for neck pain.   Skin: Negative for itching and rash.   Neurological: Positive for headaches.       Hemodynamics:  Temp (24hrs), Av.4 °C (99.3 °F), Min:36.9 °C (98.5 °F), Max:37.7 °C (99.8 °F)  Temperature: 36.9 °C (98.5 °F)  Pulse  Av.8  Min: 49  Max: 123  Blood Pressure: (!) (P) 173/99       Physical Exam:  Physical Exam   Constitutional: He is oriented to person, place, and time. He appears well-developed and well-nourished.   Eyes: Pupils are equal, round, and reactive to light. Conjunctivae and EOM are normal.   Cardiovascular: Normal rate, regular rhythm and normal heart sounds.    No murmur heard.  Pulmonary/Chest: Effort normal and breath sounds normal. No respiratory distress. He has no wheezes. He has no rales.   Abdominal: Soft. Bowel sounds are normal. He exhibits no distension. There is no tenderness. There is no rebound.   Musculoskeletal: Normal range of motion.   Neurological: He is alert and oriented to person, place, and time.   Skin: Skin is warm and dry.       Meds:    Current Facility-Administered Medications:   •  potassium chloride SA  •  enalaprilat  •  nicotine **AND** Nicotine Replacement Patient Education Materials **AND** nicotine  polacrilex  •  cefTRIAXone (ROCEPHIN) IV  •  ketorolac  •  bisacodyl  •  hydrALAZINE  •  senna-docusate **AND** polyethylene glycol/lytes **AND** magnesium hydroxide **AND** bisacodyl  •  NS  •  heparin  •  ondansetron  •  ondansetron  •  promethazine  •  promethazine  •  prochlorperazine  •  acetaminophen  •  cyclobenzaprine    Labs:  Recent Labs      12/13/18   0238  12/14/18   0319  12/15/18   0400   WBC  19.0*  22.5*  19.1*   RBC  4.45*  4.79  4.74   HEMOGLOBIN  13.1*  14.0  14.1   HEMATOCRIT  38.9*  41.0*  40.0*   MCV  87.4  85.6  84.4   MCH  29.4  29.2  29.7   RDW  42.6  42.4  41.4   PLATELETCT  175  196  244   MPV  9.8  10.5  9.9   NEUTSPOLYS  77.10*  74.00*  77.20*   LYMPHOCYTES  10.50*  11.00*  9.40*   MONOCYTES  10.40  10.00  10.50   EOSINOPHILS  0.40  0.00  1.40   BASOPHILS  0.40  0.00  0.50   RBCMORPHOLO   --   Normal   --      Recent Labs      12/13/18   0238  12/14/18   0319  12/15/18   0400   SODIUM  137  134*  134*   POTASSIUM  3.5*  3.3*  3.2*   CHLORIDE  110  106  103   CO2  19*  18*  22   GLUCOSE  97  110*  134*   BUN  9  8  11     Recent Labs      12/13/18   0238  12/14/18   0319  12/15/18   0400   ALBUMIN  3.4   --    --    TBILIRUBIN  1.0   --    --    ALKPHOSPHAT  59   --    --    TOTPROTEIN  5.9*   --    --    ALTSGPT  53*   --    --    ASTSGOT  39   --    --    CREATININE  0.73  0.77  0.74       Imaging:  Ct-chest (thorax) W/o    Addendum Date: 12/14/2018    Tubular ectasia of the ascending thoracic aorta measuring 4.3 cm in diameter.    Result Date: 12/14/2018 12/14/2018 8:03 PM HISTORY/REASON FOR EXAM:  Pneumonia, unresolved or complicated. TECHNIQUE/EXAM DESCRIPTION: CT scan of the chest without contrast. Noncontrast helical scanning of the chest was obtained from the lung apices through the adrenal glands. Low dose optimization technique was utilized for this CT exam including automated exposure control and adjustment of the mA and/or kV according to patient size. COMPARISON: Chest  x-ray 12/11/2018 FINDINGS: Thoracic aorta is prominent measuring 4.3 cm in diameter. Mild diffuse pericardial thickening. Small bilateral pleural effusions, large on the LEFT, with associated pulmonary consolidation. Hazy opacity in the RIGHT upper lobe measuring 2.9 x 3.4 x 2.3 cm. Linear opacity in the LEFT lower lobe anteriorly, consistent with atelectasis. No pneumothorax. Liver shows diffuse low-attenuation. Stranding is seen adjacent LEFT kidney. No major bony abnormality is seen.     1.  Focal groundglass opacity in the RIGHT upper lobe, likely pneumonitis.  Follow-up recommended to ensure resolution as infiltrating tumor is not entirely excluded. 2.  Small bilateral pleural effusions with associated atelectasis, slightly worse in the LEFT. 3.  Pericardial thickening versus small effusion. 4.  Fatty infiltration of liver. 5.  Edema adjacent LEFT kidney, nonspecific.  Inflammatory process such as pyelonephritis is not excluded.     Ct-renal Colic Evaluation(a/p W/o)    Result Date: 12/11/2018 12/11/2018 1:14 PM HISTORY/REASON FOR EXAM: Back pain. TECHNIQUE/EXAM DESCRIPTION AND NUMBER OF VIEWS: CT scan renal/colic without contrast. Helical images of the abdomen and pelvis were obtained from the diaphragmatic domes through the pubic symphysis. Low dose optimization technique was utilized for this CT exam including automated exposure control and adjustment of the mA and/or kV according to patient size. COMPARISON: None. FINDINGS: Evaluation of parenchymal and vascular structures is limited by the lack of intravenous contrast. Lung bases: There is patchy right lower lobe airspace opacity. There is mild lingula and left lower lobe atelectasis. Examination is limited by patient motion. Abdomen: No free air is seen in the abdomen or pelvis. The liver is hypodense compatible with hepatic steatosis. There is fatty sparing adjacent to the gallbladder. Gallbladder appears unremarkable. Spleen is not enlarged. No adrenal  mass is identified. There is no evidence of hydronephrosis. Small hypodense left renal lesions are too small to characterize. Pancreas appears unremarkable. Aorta is not aneurysmal. There is atherosclerotic plaque. There are small inguinal, retroperitoneal and mesenteric lymph nodes. There is no evidence of bowel obstruction. Terminal ileum is unremarkable. There is no evidence of acute appendicitis. Bladder is moderately distended. Calcific densities in the pelvis likely represent phleboliths. . No free fluid is seen in the abdomen or pelvis. Degenerative changes are seen in the spine. Sclerotic foci in the pelvis likely represent bone metastases. There are pars defects at L5 bilaterally with mild grade 1 anterolisthesis of L5 on S1.     No evidence of renal, ureteral or bladder calculi. No hydronephrosis. No acute intra-abdominal abnormality is seen. Hepatic steatosis. Small hypodense left renal lesions are too small to characterize. These may represent small cysts. Patchy right lower lobe airspace opacities may represent atelectasis or pneumonitis. Atherosclerotic plaque. Bilateral pars defects at L5 with mild grade 1 anterolisthesis of L5 on S1.     Dx-chest-portable (1 View)    Result Date: 12/11/2018 12/11/2018 11:19 AM HISTORY/REASON FOR EXAM:  Chest Pain. TECHNIQUE/EXAM DESCRIPTION AND NUMBER OF VIEWS: Single portable view of the chest. COMPARISON: None FINDINGS: The heart is not enlarged. No focal consolidation, pleural effusion or pneumothorax is identified.  Costophrenic angles are clear.     No acute cardiopulmonary process is seen.    Mr-lumbar Spine-with & W/o    Result Date: 12/11/2018 12/11/2018 4:24 PM HISTORY/REASON FOR EXAM:  Back pain, rapidly progressive neuro deficit. TECHNIQUE/EXAM DESCRIPTION: MRI of the lumbar spine without and with contrast. The study was performed on a [employer] 1.5 Geri MRI scanner. T1 sagittal, T2 fast spin-echo sagittal, and T2 axial images were obtained of the  lumbar spine. T1 post-contrast fat-suppressed sagittal images were obtained. 8 mL Gadavist contrast was administered intravenously. COMPARISON:  None. FINDINGS: There is grade 1 spondylolysis of L5 on S1. There is bilateral L5 spondylolysis. At the level of L5-S1, there is diffuse disc bulge. There is no central canal stenosis. There is moderate bilateral neural foraminal stenosis. At the level of L4-5, there is no spinal or neural foraminal stenosis. At the level of L3-4, there is no spinal or neural foraminal stenosis. At the level of L2-3, there is no spinal or neural foraminal stenosis. At the level of L1-2, there is no spinal or neural foraminal stenosis. The conus terminates at the level of L1. The visualized lower thoracic spinal cord appear normal. There is no lumbar intradural lesion. The visualized pre-and paraspinal soft tissues appear normal. There is no abnormal contrast enhancement.     1.  Grade 1 spondylolysis of L5 on S1. 2.  Bilateral L5 spondylolysis. 3.  Moderate bilateral L5 neural foraminal stenosis.    Ec-echocardiogram Complete W/o Cont    Result Date: 2018  Transthoracic Echo Report Echocardiography Laboratory CONCLUSIONS No prior study is available for comparison. Normal left ventricular systolic function. Cannot rule out vegetation of the right coronary cusp of the aortic valve. Cannot rule out bicuspid aortic valve. No aortic stenosis. Moderate aortic insufficiency. Pressure half time is 275 msec. Ascending aorta is dilated with a diameter of 4.5 cm. If needed further clarification, a transesophageal echocardiogram might be needed. KYRA GARCIA Exam Date:         2018                    07:52 Exam Location:     Inpatient Priority:          Routine Ordering Physician:        OLEKSANDR HUMPHRIES Referring Physician:       [id number] Sonographer:               Noman Lemus Age:    48     Gender:    M MRN:    [id number] :    1970 BSA:    1.99   Ht (in):    66     Wt (lb):     197 Exam Type:     Complete Indications:     Endocarditis and heart valve disorders in diseases                  classified elsewhere ICD Codes:       I39 CPT Codes:       40657 BP:   156    /   75     HR: Technical Quality:       Fair MEASUREMENTS  (Male / Female) Normal Values 2D ECHO LV Diastolic Diameter PLAX        3.7 cm                4.2 - 5.9 / 3.9 - 5.3 cm LV Systolic Diameter PLAX         2 cm                  2.1 - 4.0 cm IVS Diastolic Thickness           1 cm                  LVPW Diastolic Thickness          0.97 cm               LVOT Diameter                     2.1 cm                Estimated LV Ejection Fraction    65 %                  LV Ejection Fraction MOD BP       69.5 %                >= 55  % LV Ejection Fraction MOD 4C       70.6 %                LV Ejection Fraction MOD 2C       71.9 %                IVC Diameter                      2 cm                  DOPPLER AV Peak Velocity                  1.2 m/s               AV Peak Gradient                  5.6 mmHg              AV Mean Gradient                  3.8 mmHg              AI Pressure Half Time             287 ms                LVOT Peak Velocity                1.1 m/s               AV Area Cont Eq vti               3.1 cm²               MV Velocity Time Integral         32.1 cm               Mitral E Point Velocity           1.1 m/s               Mitral E to A Ratio               1.1                   MV Pressure Half Time             34.4 ms               MV Area PHT                       6.4 cm²               MV Deceleration Time              119 ms                PV Peak Velocity                  1 m/s                 PV Peak Gradient                  4.3 mmHg              RVOT Peak Velocity                0.8 m/s               * Indicates values subject to auto-interpretation LV EF:  65    % FINDINGS Left Ventricle Normal left ventricular chamber size. Normal left ventricular wall thickness. Normal left ventricular systolic  "function. Left ventricular ejection fraction is visually estimated to be 65%. Normal regional wall motion. Normal diastolic function. Right Ventricle The right ventricle was normal in size and function. Right Atrium The right atrium is normal in size.  Normal inferior vena cava size and inspiratory collapse. Left Atrium The left atrium is normal in size.  Left atrial volume index is 23 mL/sq m. Mitral Valve Structurally normal mitral valve. No mitral stenosis. Trace mitral regurgitation. Aortic Valve Can not rule out vegetation of the right coronary cusp of the aortic valve. No aortic stenosis. Moderate aortic insufficiency. Pressure half time is 275 msec. Tricuspid Valve Structurally normal tricuspid valve. No tricuspid stenosis. Trace tricuspid regurgitation. Unable to estimate pulmonary artery pressure due to an inadequate tricuspid regurgitant jet. Pulmonic Valve Structurally normal pulmonic valve. No pulmonic stenosis. No pulmonic insufficiency. Pericardium Trivial pericardial effusion. Aorta Ascending aorta is dilated with a diameter of 4.5 cm. Colten CARTY To (Electronically Signed) Final Date:     14 December 2018                 09:55      Micro:  Results     Procedure Component Value Units Date/Time    BLOOD CULTURE [[id number]] Collected:  12/14/18 0935    Order Status:  Completed Specimen:  Blood from Peripheral Updated:  12/15/18 0900     Significant Indicator NEG     Source BLD     Site PERIPHERAL     Blood Culture No Growth    Note: Blood cultures are incubated for 5 days and  are monitored continuously.Positive blood cultures  are called to the RN and reported as soon as  they are identified.      Narrative:       Per Hospital Policy: Only change Specimen Src: to \"Line\" if  specified by physician order.    BLOOD CULTURE [[id number]] Collected:  12/14/18 0935    Order Status:  Completed Specimen:  Blood from Peripheral Updated:  12/15/18 0900     Significant Indicator NEG     Source BLD     Site " "PERIPHERAL     Blood Culture No Growth    Note: Blood cultures are incubated for 5 days and  are monitored continuously.Positive blood cultures  are called to the RN and reported as soon as  they are identified.      Narrative:       Per Hospital Policy: Only change Specimen Src: to \"Line\" if  specified by physician order.    BLOOD CULTURE [[id number]]  (Abnormal) Collected:  12/11/18 1800    Order Status:  Completed Specimen:  Blood from Peripheral Updated:  12/14/18 0845     Significant Indicator POS (POS)     Source BLD     Site PERIPHERAL     Blood Culture Growth detected by Bactec instrument. 12/12/2018  22:45 (A)      Viridans streptococcus - possible contaminant  Isolated from one bottle only, possible skin contaminant  please correlate with clinical condition.   (A)    Narrative:       CALL  Simental  171 tel. [phone],  CALLED  171 tel. [phone] 12/12/2018, 22:47, RB PERF. RESULTS CALLED  TO:[phone].  Per Hospital Policy: Only change Specimen Src: to \"Line\" if  specified by physician order.    Blood Culture [[id number]] Collected:  12/13/18 1459    Order Status:  Completed Specimen:  Blood from Peripheral Updated:  12/14/18 0835     Significant Indicator NEG     Source BLD     Site PERIPHERAL     Blood Culture No Growth    Note: Blood cultures are incubated for 5 days and  are monitored continuously.Positive blood cultures  are called to the RN and reported as soon as  they are identified.      Narrative:       Per Hospital Policy: Only change Specimen Src: to \"Line\" if  specified by physician order.    URINALYSIS [[id number]]  (Abnormal) Collected:  12/13/18 0336    Order Status:  Completed Specimen:  Urine from Urine, Clean Catch Updated:  12/13/18 0348     Color Yellow     Character Clear     Specific Gravity 1.009     Ph 6.5     Glucose Negative mg/dL      Ketones 40 (A) mg/dL      Protein Negative mg/dL      Bilirubin Negative     Urobilinogen, Urine 0.2     Nitrite Negative     Leukocyte Esterase " "Negative     Occult Blood Negative     Micro Urine Req see below     Comment: Microscopic examination not performed when specimen is clear  and chemically negative for protein, blood, leukocyte esterase  and nitrite.         Narrative:       Collected By:[id number] OLIMPIA VARELA    BLOOD CULTURE [[id number]] Collected:  12/11/18 1510    Order Status:  Completed Specimen:  Blood from Peripheral Updated:  12/12/18 0828     Significant Indicator NEG     Source BLD     Site PERIPHERAL     Blood Culture No Growth    Note: Blood cultures are incubated for 5 days and  are monitored continuously.Positive blood cultures  are called to the RN and reported as soon as  they are identified.      Narrative:       Per Hospital Policy: Only change Specimen Src: to \"Line\" if  specified by physician order.    Urinalysis, culture if indicated [[id number]] Collected:  12/11/18 2345    Order Status:  Completed Specimen:  Urine Updated:  12/12/18 0023     Micro Urine Req see below     Comment: Microscopic examination not performed when specimen is clear  and chemically negative for protein, blood, leukocyte esterase  and nitrite.          Color Yellow     Character Clear     Specific Gravity >=1.030     Ph 5.5     Glucose Negative mg/dL      Ketones Negative mg/dL      Protein Negative mg/dL      Bilirubin Negative     Urobilinogen, Urine 0.2     Nitrite Negative     Leukocyte Esterase Negative     Occult Blood Negative    URINALYSIS [[id number]] Collected:  12/11/18 0000    Order Status:  Canceled Specimen:  Urine from Urine, Clean Catch           Assessment:  Active Hospital Problems    Diagnosis   • Encephalopathy acute [G93.40]   • Hypotension [I95.9]   • Acute kidney injury (HCC) [N17.9]   • Bacteremia [R78.81]   • Hypokalemia [E87.6]   • Pneumonia [J18.9]   • Constipation [K59.00]   • Abdominal pain [R10.9]   • Nicotine abuse [Z72.0]   • Leukocytosis [D72.829]   • Methamphetamine abuse (HCC) [F15.10]        ASSESSMENT:      48 y.o. male " admitted 12/11/2018. Pt has a past medical history of methamphetamine abuse and urine drug screen this admission positive for amphetamines, opiates, and cannabinoids.. He presented to the ER complaining of low back pain.  He was afebrile but was found to be hypotensive which responded to IVF.  Leukocytosis of 13.2. He was also noted to have acute kidney injury.  Chest x-ray with no indication of pneumonia. CT and MRI of the lumbar spine were unrevealing.  CT renal evaluation with no acute concerning findings but did report patchy right lung lower airspace opacities. Blood cultures were obtained with growth of viridans Streptococcus 1/2 sets.  Repeat cultures so far no growth.  He was initially started on Unasyn, transitioned to ceftriaxone 2 g every 24 on 12/13 remains on to date.      Today patient with new fevers to 101.4, tachycardia.  He is satting well on room air.  TTE completed today- Cannot rule out vegetation on aortic valve.    CT chest with right upper lobe hazy opacity, suspect pneumonitis.        Bacteremia, Virdens strep  -Unknown source but patient methamphetamine user likely poor oral care, denies IV drug use, overall poor historian  -MRI with no indication of discitis or abscess     ?Native aortic valve infective endocarditis-cannot rule out per TTE on 12/14     Right lung opacities on CT A/P  -Concern for pneumonia  -CT chest 12/14 Focal groundglass opacity in the RIGHT upper lobe, likely pneumonitis.  Follow-up recommended to ensure this is not a tumor.      Fevers, 2/2 above     Leukocytosis, 2/2 above     Methamphetamine and opiate abuse  -HIV screening as well as hepatitis panel all negative          PLAN:      Continue ceftriaxone 2 g q. 24   Please obtain TIMA  Repeat blood cultures 2 sets - will follow           Plan of care discussed with KEVEN Hernandez M.D.. Will continue to follow     Claudia Calix M.D.

## 2018-12-15 NOTE — PROGRESS NOTES
REC'D BEDSIDE REPORT FROM JERMAIN RN4*. PT AOX4*. PT DENIES PAIN, SHORTNESS OF BREATH. PLAN OF CARE AND FALL PRECAUTIONS REVIEWED WITH PT. PT DID VERBALIZE UNDERSTANDING, BED ALARM ENGAGED, CALL ORDAZ LEFT IN REACH

## 2018-12-16 NOTE — PROGRESS NOTES
Received bedside report from PM NIKOLAY Montgomery. Assumed patient care. Chart reviewed. Pt was resting in bed. A&O x 4. No concerns, complaints or distress. POC updated with pt and on the patient communication board. Bed locked and in the lowest position. Call light within reach. Tele box on. Will continue to monitor.

## 2018-12-16 NOTE — PROGRESS NOTES
REC'D BEDSIDE REPORT FROM CHRISTINE RN*. PT AOX4*. PT DENIES PAIN, SHORTNESS OF BREATH. PLAN OF CARE AND FALL PRECAUTIONS REVIEWED WITH PT. PT DID VERBALIZE UNDERSTANDING, BED ALARM ENGAGED, CALL ORDAZ LEFT IN REACH

## 2018-12-16 NOTE — PROGRESS NOTES
Pt c/o headache. Pt is grimacing. Pt's PIV in LFA occluded. Removed and placed new PIV. RN  used sterile technique. Pt tolerated well. Medicated per MAR for HA

## 2018-12-16 NOTE — PROGRESS NOTES
Hospital Medicine Daily Progress Note    Date of Service  12/15/2018    Chief Complaint  48 y.o. male admitted 12/11/2018 with pain    Hospital Course     Mr Serrano is a 48 y.o. male PMH of methamphetamine abuse, who presented with low back pain. The patient is somewhat poor historian.  He kept falling asleep during the interview. In the ER he was found to have hypotensive with systolic blood pressure around 70s.  He was given 1 L of IV fluid bolus and his systolic blood pressure go up to around 95.   He had a CT scan of renal done with no acute finding.  Also MRI of the lumbar spine was done only showing moderate spinal stenosis.      Interval Problem Update  He reported headache earlier but resolved  Denies further abdominal or back pain  Denies sob  Remains axox3  Ros otherwise negative    Consultants/Specialty  ID  Cardiology    Code Status  Full    Disposition  TBD    Review of Systems  Review of Systems   Constitutional: Negative.  Negative for chills, diaphoresis, fever and malaise/fatigue.   HENT: Negative.  Negative for sore throat.    Eyes: Negative.  Negative for blurred vision.   Respiratory: Negative.  Negative for cough and shortness of breath.    Cardiovascular: Negative.  Negative for chest pain, palpitations and leg swelling.   Gastrointestinal: Negative for abdominal pain, constipation, nausea and vomiting.   Genitourinary: Negative.  Negative for dysuria.   Musculoskeletal: Negative for back pain and myalgias.   Skin: Negative.  Negative for itching and rash.   Neurological: Negative.  Negative for dizziness, focal weakness, weakness and headaches.   Endo/Heme/Allergies: Negative.  Does not bruise/bleed easily.   Psychiatric/Behavioral: Positive for substance abuse. Negative for depression and suicidal ideas.   All other systems reviewed and are negative.       Physical Exam  Temp:  [36.9 °C (98.5 °F)-37.7 °C (99.8 °F)] 37.6 °C (99.7 °F)  Pulse:  [100-123] 111  Resp:  [15-20] 18  BP:  (151-197)/() 155/101    Physical Exam   Constitutional: He is oriented to person, place, and time. He appears well-developed and well-nourished. No distress.   HENT:   Head: Normocephalic and atraumatic.   Mouth/Throat: Oropharynx is clear and moist.   Eyes: Conjunctivae are normal. Right eye exhibits no discharge.   Cardiovascular: Normal rate, regular rhythm, normal heart sounds and intact distal pulses.  Exam reveals no gallop and no friction rub.    No murmur heard.  Pulmonary/Chest: Effort normal and breath sounds normal. No respiratory distress. He has no wheezes. He has no rales. He exhibits no tenderness.   Abdominal: Soft. Bowel sounds are normal. He exhibits no distension and no mass. There is no tenderness. There is no rebound and no guarding.   Musculoskeletal: Normal range of motion. He exhibits no edema, tenderness or deformity.   Neurological: He is alert and oriented to person, place, and time. He has normal reflexes. No cranial nerve deficit. He exhibits normal muscle tone. Coordination normal.   Skin: Skin is warm and dry. No rash noted. He is not diaphoretic. No erythema. No pallor.   Psychiatric: He has a normal mood and affect. His behavior is normal. Judgment and thought content normal.   Nursing note and vitals reviewed.      Fluids    Intake/Output Summary (Last 24 hours) at 12/15/18 1647  Last data filed at 12/15/18 0600   Gross per 24 hour   Intake              580 ml   Output                0 ml   Net              580 ml       Laboratory  Recent Labs      12/13/18   0238  12/14/18   0319  12/15/18   0400   WBC  19.0*  22.5*  19.1*   RBC  4.45*  4.79  4.74   HEMOGLOBIN  13.1*  14.0  14.1   HEMATOCRIT  38.9*  41.0*  40.0*   MCV  87.4  85.6  84.4   MCH  29.4  29.2  29.7   MCHC  33.7  34.1  35.3   RDW  42.6  42.4  41.4   PLATELETCT  175  196  244   MPV  9.8  10.5  9.9     Recent Labs      12/13/18   0238  12/14/18   0319  12/15/18   0400   SODIUM  137  134*  134*   POTASSIUM  3.5*  3.3*   3.2*   CHLORIDE  110  106  103   CO2  19*  18*  22   GLUCOSE  97  110*  134*   BUN  9  8  11   CREATININE  0.73  0.77  0.74   CALCIUM  8.1*  8.6  8.5                   Imaging  CT-CHEST (THORAX) W/O   Final Result   Addendum 1 of 1   Tubular ectasia of the ascending thoracic aorta measuring 4.3 cm in    diameter.      Final      1.  Focal groundglass opacity in the RIGHT upper lobe, likely pneumonitis.  Follow-up recommended to ensure resolution as infiltrating tumor is not entirely excluded.   2.  Small bilateral pleural effusions with associated atelectasis, slightly worse in the LEFT.   3.  Pericardial thickening versus small effusion.   4.  Fatty infiltration of liver.   5.  Edema adjacent LEFT kidney, nonspecific.  Inflammatory process such as pyelonephritis is not excluded.         EC-ECHOCARDIOGRAM COMPLETE W/O CONT   Final Result      MR-LUMBAR SPINE-WITH & W/O   Final Result      1.  Grade 1 spondylolysis of L5 on S1.   2.  Bilateral L5 spondylolysis.   3.  Moderate bilateral L5 neural foraminal stenosis.      CT-RENAL COLIC EVALUATION(A/P W/O)   Final Result      No evidence of renal, ureteral or bladder calculi. No hydronephrosis.      No acute intra-abdominal abnormality is seen.      Hepatic steatosis.      Small hypodense left renal lesions are too small to characterize. These may represent small cysts.      Patchy right lower lobe airspace opacities may represent atelectasis or pneumonitis.      Atherosclerotic plaque.      Bilateral pars defects at L5 with mild grade 1 anterolisthesis of L5 on S1.         DX-CHEST-PORTABLE (1 VIEW)   Final Result      No acute cardiopulmonary process is seen.           Assessment/Plan  Acute kidney injury (HCC)- (present on admission)   Assessment & Plan    Resolved  Avoid nephrotoxic drugs       Hypotension- (present on admission)   Assessment & Plan    Resolved   Continue to follow  Continue IV fluids     Encephalopathy acute- (present on admission)   Assessment &  Plan    Resolved  Suspect multifactorial etiology with intoxication or w/d contributed, sepsis could have also contributed         Hypokalemia   Assessment & Plan    Continue replacement  Will check mag     Bacteremia   Assessment & Plan    See above  Possible endocarditis  H/o meth use, hiv also pending     Abdominal pain   Assessment & Plan    Resolved  Suspected due to constipation     Constipation   Assessment & Plan    See above  resolved  Continue bowel regimen     Pneumonia   Assessment & Plan    Continue supportive care and empiric abx  See above     Leukocytosis- (present on admission)   Assessment & Plan    Continues to increase with fever  Possible vegetation on echo, suspected endocarditis  Cardiology consulted for TIMA  CT chest to better eval possible pna  ID following  Continue rocephin     Nicotine abuse- (present on admission)   Assessment & Plan    Cessation discussed and recommended  Replacement ordered          VTE prophylaxis: heparin

## 2018-12-16 NOTE — PROGRESS NOTES
Pt at this time is refusing  Bed alarm. Fall precautions reviewe d pt. Pt encouraged to put yellow socks on.

## 2018-12-16 NOTE — CARE PLAN
Problem: Safety  Goal: Will remain free from injury  Educated pt on safety precautions and pt has verbalized understanding. Pt has demonstrated proper use of the call light and calls appropriately. Pt is wearing non slip socks, in bed in the low locked position and the call light is within reach    Problem: Knowledge Deficit  Goal: Knowledge of disease process/condition, treatment plan, diagnostic tests, and medications will improve  Outcome: PROGRESSING SLOWER THAN EXPECTED  Pt will learn about disease process and all questions addressed and answered.

## 2018-12-16 NOTE — PROGRESS NOTES
"Patient reports seeing \"bugs\", \"floaties\", \"squiggles\" with his periferal vision and blurred vision especially with his right eye when he is ambulating. When he is turning his head, the \"bugs\", \"floaties\", \"squiggles\" disappear. Pt' VSS. All needs attended at this time. Pt denies pain.       "

## 2018-12-17 NOTE — ED PROVIDER NOTES
ED Provider Note    Scribed for Daily Hernandez M.D. by Delio Kohli. 12/17/2018  10:08 AM    Primary care provider: No primary care provider on file.  History obtained from: Nursing staff  History limited by: acute medical condition     CHIEF COMPLAINT  Chief Complaint   Patient presents with   • Leg Pain   • Chest Pain       HPI  Trevor Serrano is a 48 y.o. male who presents to the Emergency Department called to the floor for code blue activation. Patient was admitted to the hospital for endocarditis. He complained of chest pain with associated neck pain to nursing staff at approximately 9:45 AM today. Patient then had a witnessed cardiac arrest and CPR was initiated at 9:46 AM.     Further HPI cannot be obtained due to the patient's acute medical condition.    REVIEW OF SYSTEMS  Pertinent positives include cardiac arrest, chest pain, neck pain.   See HPI for further details.     Further ROS cannot be obtained due to the patient's acute medical condition.    PAST MEDICAL HISTORY   Endocarditis    SURGICAL HISTORY  patient denies any surgical history    SOCIAL HISTORY  Social History   Substance Use Topics   • Smoking status: Current Every Day Smoker     Packs/day: 0.50     Years: 35.00     Types: Cigarettes     Start date: 12/12/1983   • Smokeless tobacco: No   • Alcohol use No      History   Drug Use   • Types: Inhaled     Comment: methamphetamines, marijuana,        FAMILY HISTORY  None noted    CURRENT MEDICATIONS  Home Medications     Reviewed by Jordan Miranda (Pharmacy Tech) on 12/11/18 at 1450  Med List Status: Complete   Medication Last Dose Status        Patient Raymundo Taking any Medications                     ALLERGIES  No Known Allergies    PHYSICAL EXAM  VITAL SIGNS: Asystole, BGL 83    Nursing note and vitals reviewed.  Constitutional: Critical ill male undergoing CPR  HENT: Head is normocephalic and atraumatic.   Eyes: Pupils are fixed bilaterally at 3 mm.  Cardiovascular: Undergoing  CPR  Pulmonary/Chest: Breathing is assisted by bag valve mask.   Abdominal: Soft. No distention    Musculoskeletal: Good muscle tone.    Neurological: Unresponsive.  GCS of 3.  Skin: Pale, mottled.     DIAGNOSTIC STUDIES / PROCEDURES    Intubation Procedure Note    Indication: Cardiac arrest    Consent: Unable to be obtained due to the emergent nature of this procedure.    Medications Used: Unable to premedicate due to the emergent nature of the procedure    Procedure: The patient was placed in the appropriate position.  Cricoid pressure was utilized.  Intubation was performed by direct laryngoscopy using a laryngoscope and an 8.0 cuffed endotracheal tube.  The cuff was then inflated and the tube was secured appropriately at a distance of 24 cm to the dental ridge.  Initial confirmation of placement included bilateral breath sounds, an end tidal CO2 detector, absence of sounds over the stomach, tube fogging and adequate chest rise.  A chest x-ray to verify correct placement of the tube was not ordered as the patient is .    The patient tolerated the procedure well.     Complications: None    COURSE & MEDICAL DECISION MAKING  Nursing notes, VS, PMSFHx reviewed in chart.     9:48 AM - Code blue called overhead.     9:51 AM - Arrived to [address]. High quality CPR compressions in progress.     9:52 AM - Began intubation procedure.     9:53 AM - Pulse check. No pulse. Compressions resumed.     9:55 AM - Pulse check. No pulse. Compressions resumed.     9:56 AM - 1 mg epinephrine administered.     9:57 AM - Pulse check. No pulse.     9:58 AM - Patient received AED shock. Compressions resumed.     9:59 AM - 1 mg epinephrine administered     10:00 AM - Pulse check. Patient is in PEA. Compressions resumed. Finger stick BGL 83.    10:01 AM - Bicarbonate administered. Patient has had a total of 5 mg epinephrine.     10:02 AM - Pulse check. Patient is in PEA. Compressions resumed.     10:03 AM - Attending  physician Dr. Stinson called for compressions to stop. Time of death 10:03 AM.  Please see nursing note for activity that occurred prior to arrival.  I believe that at least 6 rounds of ACLS medications were administered.    DISPOSITION:  Patient .    FINAL IMPRESSION  Intubation procedure performed by ERP  1.  Cardiac arrest  2.  Endocarditis  3.  Methamphetamine use     Delio FALCON (Scribe), am scribing for, and in the presence of, Daily Hernandez M.D..    Electronically signed by: Delio Kohli (Scribe), 2018    IDaily M.D. personally performed the services described in this documentation, as scribed by Delio Kohli in my presence, and it is both accurate and complete.    The note accurately reflects work and decisions made by me.  Abraham Santos  2018  1:13 PM

## 2018-12-17 NOTE — RESPIRATORY CARE
Cardiopulmonary Resuscitation/Intubation Assist    Intubation assist performed YES  Reason for intubation APNEA/CODE BLUE  Positive Color Change on EZCap? YES  EtCO2 mmH/13  Difficult Intubation/Number of attempts NO/1  Evidence of aspiration NO        Events/Summary/Plan: intubated by ER Physician during code blue (18)        Respiratory Rapid Response Note     Breath Sounds  Pre/Post Intervention: Post Intervention Assessment (18)  RUL Breath Sounds: Clear;Diminished (18)  RML Breath Sounds: Clear;Diminished (18)  RLL Breath Sounds: Diminished (18)  AIDAN Breath Sounds: Clear;Diminished (18)  LLL Breath Sounds: Diminished (18)        Events/Summary/Plan: intubated by ER Physician during code blue (18)  Transferred to ICU - pt

## 2018-12-17 NOTE — DISCHARGE SUMMARY
Death Summary    Cause of Death  Cardiac arrest due to pulseless electrical activity due to endocarditis due to bacteremia    Comorbid Conditions at the Time of Death  Active Problems:    Encephalopathy acute POA: Yes    Hypotension POA: Yes    Acute kidney injury (HCC) POA: Yes    Nicotine abuse POA: Yes    Leukocytosis POA: Yes    Methamphetamine abuse (HCC) POA: Yes    Pneumonia POA: Unknown    Constipation POA: Unknown    Abdominal pain POA: Unknown    Bacteremia POA: Unknown    Hypokalemia POA: Unknown  Resolved Problems:    * No resolved hospital problems. *    CT-CHEST (THORAX) W/O   Final Result   Addendum 1 of 1   Tubular ectasia of the ascending thoracic aorta measuring 4.3 cm in    diameter.      Final      1.  Focal groundglass opacity in the RIGHT upper lobe, likely pneumonitis.  Follow-up recommended to ensure resolution as infiltrating tumor is not entirely excluded.   2.  Small bilateral pleural effusions with associated atelectasis, slightly worse in the LEFT.   3.  Pericardial thickening versus small effusion.   4.  Fatty infiltration of liver.   5.  Edema adjacent LEFT kidney, nonspecific.  Inflammatory process such as pyelonephritis is not excluded.         EC-ECHOCARDIOGRAM COMPLETE W/O CONT   Final Result      MR-LUMBAR SPINE-WITH & W/O   Final Result      1.  Grade 1 spondylolysis of L5 on S1.   2.  Bilateral L5 spondylolysis.   3.  Moderate bilateral L5 neural foraminal stenosis.      CT-RENAL COLIC EVALUATION(A/P W/O)   Final Result      No evidence of renal, ureteral or bladder calculi. No hydronephrosis.      No acute intra-abdominal abnormality is seen.      Hepatic steatosis.      Small hypodense left renal lesions are too small to characterize. These may represent small cysts.      Patchy right lower lobe airspace opacities may represent atelectasis or pneumonitis.      Atherosclerotic plaque.      Bilateral pars defects at L5 with mild grade 1 anterolisthesis of L5 on S1.          DX-CHEST-PORTABLE (1 VIEW)   Final Result      No acute cardiopulmonary process is seen.            History of Presenting Illness and Hospital Course  Patient was a pleasant 48 year old with history of methamphetamine abuse who presented to the ER with confusion and back pain.  He was found to be hypotensive.  His initial hypotension resolved with iv fluids and his confusion also resolved.  An mri of the spine showed moderate spinal stenosis but no other significant findings and he had no further back pain during his hospital stay.  He was found to be bacteremic with strep viridans with suspected endocarditis with suspected vegetation on his aortic valve. He was also being treated for a possible pneumonia or pneumonitis. He was being followed by cardiology and a TIMA was planned.  He was also being followed by infectious disease and a GI consult had been placed after an episode of bright red blood per rectum (however his hemoglobin remained stable). A code was called on 12/17/18 after patient was noted to be apneic.  He was in PEA and after intubation and multiple rounds of cpr were unsuccessful he was pronounced.      Death Date: 12/17/18   Death Time: 1003      Pronounced By (MD): Dr. Hernandez

## 2018-12-17 NOTE — PROGRESS NOTES
PT BLEEDING FROM RECTUM, PAL BLOOD IN BOWL. PT AO4, , HR IS ELEVATED BETWEEN 120-132.   CALL PLACED OUT TO HOSPITALIST DR OZUNA, WILL FOLLOW UP WITH ORDERS.    FALL PRECAUTIONS REVIEWED WITH PT, PT DID AGREE TO BED ALARM AND ALSO AGREED TO CALL FOR ASSITANCE

## 2018-12-17 NOTE — PROGRESS NOTES
Hospital Medicine Daily Progress Note    Date of Service  12/16/2018    Chief Complaint  48 y.o. male admitted 12/11/2018 with pain    Hospital Course     Mr Serrano is a 48 y.o. male PMH of methamphetamine abuse, who presented with low back pain. The patient is somewhat poor historian.  He kept falling asleep during the interview. In the ER he was found to have hypotensive with systolic blood pressure around 70s.  He was given 1 L of IV fluid bolus and his systolic blood pressure go up to around 95.   He had a CT scan of renal done with no acute finding.  Also MRI of the lumbar spine was done only showing moderate spinal stenosis.      Interval Problem Update  Denies pain today  axox3  Ros otherwise negative    Consultants/Specialty  ID  Cardiology    Code Status  Full    Disposition  TBD    Review of Systems  Review of Systems   Constitutional: Negative.  Negative for chills, diaphoresis, fever and malaise/fatigue.   HENT: Negative.  Negative for sore throat.    Eyes: Negative.  Negative for blurred vision.   Respiratory: Negative.  Negative for cough and shortness of breath.    Cardiovascular: Negative.  Negative for chest pain, palpitations and leg swelling.   Gastrointestinal: Negative for abdominal pain, constipation, nausea and vomiting.   Genitourinary: Negative.  Negative for dysuria.   Musculoskeletal: Negative for back pain and myalgias.   Skin: Negative.  Negative for itching and rash.   Neurological: Negative.  Negative for dizziness, focal weakness, weakness and headaches.   Endo/Heme/Allergies: Negative.  Does not bruise/bleed easily.   Psychiatric/Behavioral: Positive for substance abuse. Negative for depression and suicidal ideas.   All other systems reviewed and are negative.       Physical Exam  Temp:  [36.9 °C (98.5 °F)-37.4 °C (99.4 °F)] 37 °C (98.6 °F)  Pulse:  [105-128] 128  Resp:  [19-20] 20  BP: (149-197)/() 153/95    Physical Exam   Constitutional: He is oriented to person, place, and  time. He appears well-developed and well-nourished. No distress.   HENT:   Head: Normocephalic and atraumatic.   Mouth/Throat: Oropharynx is clear and moist.   Eyes: Conjunctivae are normal. Right eye exhibits no discharge.   Cardiovascular: Normal rate, regular rhythm, normal heart sounds and intact distal pulses.  Exam reveals no gallop and no friction rub.    No murmur heard.  Pulmonary/Chest: Effort normal and breath sounds normal. No respiratory distress. He has no wheezes. He has no rales. He exhibits no tenderness.   Abdominal: Soft. Bowel sounds are normal. He exhibits no distension and no mass. There is no tenderness. There is no rebound and no guarding.   Musculoskeletal: Normal range of motion. He exhibits no edema, tenderness or deformity.   Neurological: He is alert and oriented to person, place, and time. He has normal reflexes. No cranial nerve deficit. He exhibits normal muscle tone. Coordination normal.   Skin: Skin is warm and dry. No rash noted. He is not diaphoretic. No erythema. No pallor.   Psychiatric: He has a normal mood and affect. His behavior is normal. Judgment and thought content normal.   Nursing note and vitals reviewed.      Fluids    Intake/Output Summary (Last 24 hours) at 12/16/18 1612  Last data filed at 12/16/18 0900   Gross per 24 hour   Intake              960 ml   Output              600 ml   Net              360 ml       Laboratory  Recent Labs      12/14/18   0319  12/15/18   0400  12/16/18   0348   WBC  22.5*  19.1*  19.1*   RBC  4.79  4.74  4.88   HEMOGLOBIN  14.0  14.1  14.2   HEMATOCRIT  41.0*  40.0*  41.8*   MCV  85.6  84.4  85.7   MCH  29.2  29.7  29.1   MCHC  34.1  35.3  34.0   RDW  42.4  41.4  42.9   PLATELETCT  196  244  319   MPV  10.5  9.9  9.8     Recent Labs      12/14/18   0319  12/15/18   0400  12/16/18   0348   SODIUM  134*  134*  135   POTASSIUM  3.3*  3.2*  3.4*   CHLORIDE  106  103  105   CO2  18*  22  19*   GLUCOSE  110*  134*  155*   BUN  8  11  15    CREATININE  0.77  0.74  0.72   CALCIUM  8.6  8.5  8.4*                   Imaging  CT-CHEST (THORAX) W/O   Final Result   Addendum 1 of 1   Tubular ectasia of the ascending thoracic aorta measuring 4.3 cm in    diameter.      Final      1.  Focal groundglass opacity in the RIGHT upper lobe, likely pneumonitis.  Follow-up recommended to ensure resolution as infiltrating tumor is not entirely excluded.   2.  Small bilateral pleural effusions with associated atelectasis, slightly worse in the LEFT.   3.  Pericardial thickening versus small effusion.   4.  Fatty infiltration of liver.   5.  Edema adjacent LEFT kidney, nonspecific.  Inflammatory process such as pyelonephritis is not excluded.         EC-ECHOCARDIOGRAM COMPLETE W/O CONT   Final Result      MR-LUMBAR SPINE-WITH & W/O   Final Result      1.  Grade 1 spondylolysis of L5 on S1.   2.  Bilateral L5 spondylolysis.   3.  Moderate bilateral L5 neural foraminal stenosis.      CT-RENAL COLIC EVALUATION(A/P W/O)   Final Result      No evidence of renal, ureteral or bladder calculi. No hydronephrosis.      No acute intra-abdominal abnormality is seen.      Hepatic steatosis.      Small hypodense left renal lesions are too small to characterize. These may represent small cysts.      Patchy right lower lobe airspace opacities may represent atelectasis or pneumonitis.      Atherosclerotic plaque.      Bilateral pars defects at L5 with mild grade 1 anterolisthesis of L5 on S1.         DX-CHEST-PORTABLE (1 VIEW)   Final Result      No acute cardiopulmonary process is seen.           Assessment/Plan  Acute kidney injury (HCC)- (present on admission)   Assessment & Plan    Resolved  Avoid nephrotoxic drugs       Hypotension- (present on admission)   Assessment & Plan    Resolved   Continue to follow     Encephalopathy acute- (present on admission)   Assessment & Plan    Resolved  Suspect multifactorial etiology with intoxication or w/d contributed, sepsis could have also  contributed         Hypokalemia   Assessment & Plan    Continue replacement  Magnesium wnl  following     Bacteremia   Assessment & Plan    See above  Possible endocarditis  H/o meth use  TIMA pending  Repeat b/c pending, 1/2 growing strep veridans     Abdominal pain   Assessment & Plan    Resolved  Suspected due to constipation     Constipation   Assessment & Plan      resolved  Continue bowel regimen     Pneumonia   Assessment & Plan    Continue supportive care and empiric abx  See above     Leukocytosis- (present on admission)   Assessment & Plan    Leukocytosis in stable range  Possible vegetation on echo, suspected endocarditis, TIMA penidng  ID following  Continue rocephin  Continue to follow     Nicotine abuse- (present on admission)   Assessment & Plan    Cessation discussed and recommended  Replacement ordered          VTE prophylaxis: heparin

## 2018-12-17 NOTE — DISCHARGE PLANNING
Met with pt 's parents, Nataly and Sam Paniagua along with Dr. Stinson. Questions answered, provided emotional support. Offered and received  visit. Provided Helpful Information at this Difficult Time booklet. Family spent time with pt.

## 2018-12-17 NOTE — PROGRESS NOTES
Spiritual Care Note           Patient's Name: Trevor Serrano   MRN: [id number]    Age and Gender: 48 y.o. male   YOB: 1970   Place of Residence: [place], [place]   Family Present: mother  stepfather   Unit: Telemetry   Room (and Bed): [place] [id number]   Bed 2   Ethnicity or Nationality: white   Primary Language: English   Medical Diagnoses: acute kidney injury   hypokalemia  bacteremia, possible endocarditis, h/o meth use  pneumonia  leukocytosis  nicotine abuse   Worship Affiliation: Rastafarian   Code Status: Full Code    Date of Admission: 2018    Length of Stay: 6 days   Date of Visit: 2018       Situation/Reason for Visit:  Received call from Christina Vasquez RN Case Manager, that parents of patient were requesting a .    Background:  Patient had  following code blue.    Observations:  Patient's body lying in bed.  Parents at bedside, tearful.      Summary of Interaction/Conversation with Family:  Parents indicated that they wanted me to pray with them.    Assessment of Cultural/Social/Emotional/Spiritual Issues:  Grief.    Interventions:  Emotional support, prayer.    Outcomes:  Spiritual comfort, emotional release, progress with grief.    Consultations/Referrals:  none    Requests/Recommendations:  none    Continuing Care:  Upon request.      Contact Information:  Chaplain SENDY Gonzalez  [phone]   [email or web address]

## 2018-12-17 NOTE — PROGRESS NOTES
Infectious Disease Progress Note    Author: Claudia Calix M.D. Date & Time of service: 2018  4:32 PM       Chief Complaint:  Viridens streptococcus bacteremia     Interval History:  12/15 AF, hypertensive , O2 RA, Labs and micro results reviewed. Imaging reviewed. Medications reviewed.  Leukocytosis continues.  Repeat blood cultures remain negative. CT chest with focal GGO and right upper lobe.     AF, O2 RA, tachycardic, hypertensive, Labs,Micro,and imaging results reviewed.Medications reviewed.  Patient somewhat agitated today.       Review of Systems:  Review of Systems   Constitutional: Negative for chills, fever and malaise/fatigue.   Respiratory: Negative for cough, sputum production and shortness of breath.    Cardiovascular: Negative for chest pain.   Gastrointestinal: Negative for abdominal pain, diarrhea, nausea and vomiting.   Skin: Negative for rash.   Neurological: Positive for headaches.   Psychiatric/Behavioral: The patient is nervous/anxious.        Hemodynamics:  Temp (24hrs), Av.2 °C (98.9 °F), Min:36.9 °C (98.5 °F), Max:37.4 °C (99.4 °F)  Temperature: 37 °C (98.6 °F)  Pulse  Av.1  Min: 49  Max: 137  Blood Pressure: 153/95       Physical Exam:  Physical Exam   Constitutional: He is oriented to person, place, and time. He appears well-developed and well-nourished.   Eyes: Pupils are equal, round, and reactive to light. Conjunctivae and EOM are normal.   Cardiovascular: Regular rhythm and normal heart sounds.    Tachycardic   Pulmonary/Chest: Effort normal and breath sounds normal. No respiratory distress. He has no wheezes. He has no rales.   Abdominal: Soft. Bowel sounds are normal. He exhibits no distension. There is no tenderness. There is no rebound and no guarding.   Musculoskeletal: Normal range of motion. He exhibits no edema.   Neurological: He is alert and oriented to person, place, and time.   Skin: Skin is warm and dry. He is not diaphoretic.   Multiple tattoos    Psychiatric:   Patient anxious, and agitated       Meds:    Current Facility-Administered Medications:   •  sodium chloride  •  potassium chloride SA  •  enalaprilat  •  nicotine **AND** Nicotine Replacement Patient Education Materials **AND** nicotine polacrilex  •  cefTRIAXone (ROCEPHIN) IV  •  ketorolac  •  bisacodyl  •  hydrALAZINE  •  senna-docusate **AND** polyethylene glycol/lytes **AND** magnesium hydroxide **AND** bisacodyl  •  heparin  •  ondansetron  •  ondansetron  •  promethazine  •  promethazine  •  prochlorperazine  •  acetaminophen  •  cyclobenzaprine    Labs:  Recent Labs      12/14/18   0319  12/15/18   0400  12/16/18   0348   WBC  22.5*  19.1*  19.1*   RBC  4.79  4.74  4.88   HEMOGLOBIN  14.0  14.1  14.2   HEMATOCRIT  41.0*  40.0*  41.8*   MCV  85.6  84.4  85.7   MCH  29.2  29.7  29.1   RDW  42.4  41.4  42.9   PLATELETCT  196  244  319   MPV  10.5  9.9  9.8   NEUTSPOLYS  74.00*  77.20*  76.30*   LYMPHOCYTES  11.00*  9.40*  9.20*   MONOCYTES  10.00  10.50  10.40   EOSINOPHILS  0.00  1.40  2.60   BASOPHILS  0.00  0.50  0.50   RBCMORPHOLO  Normal   --    --      Recent Labs      12/14/18   0319  12/15/18   0400  12/16/18   0348   SODIUM  134*  134*  135   POTASSIUM  3.3*  3.2*  3.4*   CHLORIDE  106  103  105   CO2  18*  22  19*   GLUCOSE  110*  134*  155*   BUN  8  11  15     Recent Labs      12/14/18   0319  12/15/18   0400  12/16/18   0348   CREATININE  0.77  0.74  0.72       Imaging:  Ct-chest (thorax) W/o    Addendum Date: 12/14/2018    Tubular ectasia of the ascending thoracic aorta measuring 4.3 cm in diameter.    Result Date: 12/14/2018 12/14/2018 8:03 PM HISTORY/REASON FOR EXAM:  Pneumonia, unresolved or complicated. TECHNIQUE/EXAM DESCRIPTION: CT scan of the chest without contrast. Noncontrast helical scanning of the chest was obtained from the lung apices through the adrenal glands. Low dose optimization technique was utilized for this CT exam including automated exposure control and  adjustment of the mA and/or kV according to patient size. COMPARISON: Chest x-ray 12/11/2018 FINDINGS: Thoracic aorta is prominent measuring 4.3 cm in diameter. Mild diffuse pericardial thickening. Small bilateral pleural effusions, large on the LEFT, with associated pulmonary consolidation. Hazy opacity in the RIGHT upper lobe measuring 2.9 x 3.4 x 2.3 cm. Linear opacity in the LEFT lower lobe anteriorly, consistent with atelectasis. No pneumothorax. Liver shows diffuse low-attenuation. Stranding is seen adjacent LEFT kidney. No major bony abnormality is seen.     1.  Focal groundglass opacity in the RIGHT upper lobe, likely pneumonitis.  Follow-up recommended to ensure resolution as infiltrating tumor is not entirely excluded. 2.  Small bilateral pleural effusions with associated atelectasis, slightly worse in the LEFT. 3.  Pericardial thickening versus small effusion. 4.  Fatty infiltration of liver. 5.  Edema adjacent LEFT kidney, nonspecific.  Inflammatory process such as pyelonephritis is not excluded.     Ct-renal Colic Evaluation(a/p W/o)    Result Date: 12/11/2018 12/11/2018 1:14 PM HISTORY/REASON FOR EXAM: Back pain. TECHNIQUE/EXAM DESCRIPTION AND NUMBER OF VIEWS: CT scan renal/colic without contrast. Helical images of the abdomen and pelvis were obtained from the diaphragmatic domes through the pubic symphysis. Low dose optimization technique was utilized for this CT exam including automated exposure control and adjustment of the mA and/or kV according to patient size. COMPARISON: None. FINDINGS: Evaluation of parenchymal and vascular structures is limited by the lack of intravenous contrast. Lung bases: There is patchy right lower lobe airspace opacity. There is mild lingula and left lower lobe atelectasis. Examination is limited by patient motion. Abdomen: No free air is seen in the abdomen or pelvis. The liver is hypodense compatible with hepatic steatosis. There is fatty sparing adjacent to the  gallbladder. Gallbladder appears unremarkable. Spleen is not enlarged. No adrenal mass is identified. There is no evidence of hydronephrosis. Small hypodense left renal lesions are too small to characterize. Pancreas appears unremarkable. Aorta is not aneurysmal. There is atherosclerotic plaque. There are small inguinal, retroperitoneal and mesenteric lymph nodes. There is no evidence of bowel obstruction. Terminal ileum is unremarkable. There is no evidence of acute appendicitis. Bladder is moderately distended. Calcific densities in the pelvis likely represent phleboliths. . No free fluid is seen in the abdomen or pelvis. Degenerative changes are seen in the spine. Sclerotic foci in the pelvis likely represent bone metastases. There are pars defects at L5 bilaterally with mild grade 1 anterolisthesis of L5 on S1.     No evidence of renal, ureteral or bladder calculi. No hydronephrosis. No acute intra-abdominal abnormality is seen. Hepatic steatosis. Small hypodense left renal lesions are too small to characterize. These may represent small cysts. Patchy right lower lobe airspace opacities may represent atelectasis or pneumonitis. Atherosclerotic plaque. Bilateral pars defects at L5 with mild grade 1 anterolisthesis of L5 on S1.     Dx-chest-portable (1 View)    Result Date: 12/11/2018 12/11/2018 11:19 AM HISTORY/REASON FOR EXAM:  Chest Pain. TECHNIQUE/EXAM DESCRIPTION AND NUMBER OF VIEWS: Single portable view of the chest. COMPARISON: None FINDINGS: The heart is not enlarged. No focal consolidation, pleural effusion or pneumothorax is identified.  Costophrenic angles are clear.     No acute cardiopulmonary process is seen.    Mr-lumbar Spine-with & W/o    Result Date: 12/11/2018 12/11/2018 4:24 PM HISTORY/REASON FOR EXAM:  Back pain, rapidly progressive neuro deficit. TECHNIQUE/EXAM DESCRIPTION: MRI of the lumbar spine without and with contrast. The study was performed on a Hochy etoa 1.5 Geri MRI scanner. T1  sagittal, T2 fast spin-echo sagittal, and T2 axial images were obtained of the lumbar spine. T1 post-contrast fat-suppressed sagittal images were obtained. 8 mL Gadavist contrast was administered intravenously. COMPARISON:  None. FINDINGS: There is grade 1 spondylolysis of L5 on S1. There is bilateral L5 spondylolysis. At the level of L5-S1, there is diffuse disc bulge. There is no central canal stenosis. There is moderate bilateral neural foraminal stenosis. At the level of L4-5, there is no spinal or neural foraminal stenosis. At the level of L3-4, there is no spinal or neural foraminal stenosis. At the level of L2-3, there is no spinal or neural foraminal stenosis. At the level of L1-2, there is no spinal or neural foraminal stenosis. The conus terminates at the level of L1. The visualized lower thoracic spinal cord appear normal. There is no lumbar intradural lesion. The visualized pre-and paraspinal soft tissues appear normal. There is no abnormal contrast enhancement.     1.  Grade 1 spondylolysis of L5 on S1. 2.  Bilateral L5 spondylolysis. 3.  Moderate bilateral L5 neural foraminal stenosis.    Ec-echocardiogram Complete W/o Cont    Result Date: 12/14/2018  Transthoracic Echo Report Echocardiography Laboratory CONCLUSIONS No prior study is available for comparison. Normal left ventricular systolic function. Cannot rule out vegetation of the right coronary cusp of the aortic valve. Cannot rule out bicuspid aortic valve. No aortic stenosis. Moderate aortic insufficiency. Pressure half time is 275 msec. Ascending aorta is dilated with a diameter of 4.5 cm. If needed further clarification, a transesophageal echocardiogram might be needed. KYRA GARCIA Exam Date:         12/14/2018                    07:52 Exam Location:     Inpatient Priority:          Routine Ordering Physician:        OLEKSANDR HUMPHRIES Referring Physician:       [id number] Sonographer:               Noman Lemus Age:    48     Gender:    M  MRN:    [id number] :    1970 BSA:    1.99   Ht (in):    66     Wt (lb):    197 Exam Type:     Complete Indications:     Endocarditis and heart valve disorders in diseases                  classified elsewhere ICD Codes:       I39 CPT Codes:       12121 BP:   156    /   75     HR: Technical Quality:       Fair MEASUREMENTS  (Male / Female) Normal Values 2D ECHO LV Diastolic Diameter PLAX        3.7 cm                4.2 - 5.9 / 3.9 - 5.3 cm LV Systolic Diameter PLAX         2 cm                  2.1 - 4.0 cm IVS Diastolic Thickness           1 cm                  LVPW Diastolic Thickness          0.97 cm               LVOT Diameter                     2.1 cm                Estimated LV Ejection Fraction    65 %                  LV Ejection Fraction MOD BP       69.5 %                >= 55  % LV Ejection Fraction MOD 4C       70.6 %                LV Ejection Fraction MOD 2C       71.9 %                IVC Diameter                      2 cm                  DOPPLER AV Peak Velocity                  1.2 m/s               AV Peak Gradient                  5.6 mmHg              AV Mean Gradient                  3.8 mmHg              AI Pressure Half Time             287 ms                LVOT Peak Velocity                1.1 m/s               AV Area Cont Eq vti               3.1 cm²               MV Velocity Time Integral         32.1 cm               Mitral E Point Velocity           1.1 m/s               Mitral E to A Ratio               1.1                   MV Pressure Half Time             34.4 ms               MV Area PHT                       6.4 cm²               MV Deceleration Time              119 ms                PV Peak Velocity                  1 m/s                 PV Peak Gradient                  4.3 mmHg              RVOT Peak Velocity                0.8 m/s               * Indicates values subject to auto-interpretation LV EF:  65    % FINDINGS Left Ventricle Normal left ventricular chamber  "size. Normal left ventricular wall thickness. Normal left ventricular systolic function. Left ventricular ejection fraction is visually estimated to be 65%. Normal regional wall motion. Normal diastolic function. Right Ventricle The right ventricle was normal in size and function. Right Atrium The right atrium is normal in size.  Normal inferior vena cava size and inspiratory collapse. Left Atrium The left atrium is normal in size.  Left atrial volume index is 23 mL/sq m. Mitral Valve Structurally normal mitral valve. No mitral stenosis. Trace mitral regurgitation. Aortic Valve Can not rule out vegetation of the right coronary cusp of the aortic valve. No aortic stenosis. Moderate aortic insufficiency. Pressure half time is 275 msec. Tricuspid Valve Structurally normal tricuspid valve. No tricuspid stenosis. Trace tricuspid regurgitation. Unable to estimate pulmonary artery pressure due to an inadequate tricuspid regurgitant jet. Pulmonic Valve Structurally normal pulmonic valve. No pulmonic stenosis. No pulmonic insufficiency. Pericardium Trivial pericardial effusion. Aorta Ascending aorta is dilated with a diameter of 4.5 cm. Colten Zepeda (Electronically Signed) Final Date:     14 December 2018                 09:55      Micro:  Results     Procedure Component Value Units Date/Time    BLOOD CULTURE [[id number]] Collected:  12/14/18 0935    Order Status:  Completed Specimen:  Blood from Peripheral Updated:  12/15/18 0900     Significant Indicator NEG     Source BLD     Site PERIPHERAL     Blood Culture No Growth    Note: Blood cultures are incubated for 5 days and  are monitored continuously.Positive blood cultures  are called to the RN and reported as soon as  they are identified.      Narrative:       Per Hospital Policy: Only change Specimen Src: to \"Line\" if  specified by physician order.    BLOOD CULTURE [[id number]] Collected:  12/14/18 0935    Order Status:  Completed Specimen:  Blood from Peripheral " "Updated:  12/15/18 0900     Significant Indicator NEG     Source BLD     Site PERIPHERAL     Blood Culture No Growth    Note: Blood cultures are incubated for 5 days and  are monitored continuously.Positive blood cultures  are called to the RN and reported as soon as  they are identified.      Narrative:       Per Hospital Policy: Only change Specimen Src: to \"Line\" if  specified by physician order.    BLOOD CULTURE [[id number]]  (Abnormal) Collected:  12/11/18 1800    Order Status:  Completed Specimen:  Blood from Peripheral Updated:  12/14/18 0845     Significant Indicator POS (POS)     Source BLD     Site PERIPHERAL     Blood Culture Growth detected by Bactec instrument. 12/12/2018  22:45 (A)      Viridans streptococcus - possible contaminant  Isolated from one bottle only, possible skin contaminant  please correlate with clinical condition.   (A)    Narrative:       CALL  Simental  171 tel. [phone],  CALLED  171 tel. [phone] 12/12/2018, 22:47, RB PERF. RESULTS CALLED  TO:[phone].  Per Hospital Policy: Only change Specimen Src: to \"Line\" if  specified by physician order.    Blood Culture [[id number]] Collected:  12/13/18 1459    Order Status:  Completed Specimen:  Blood from Peripheral Updated:  12/14/18 0835     Significant Indicator NEG     Source BLD     Site PERIPHERAL     Blood Culture No Growth    Note: Blood cultures are incubated for 5 days and  are monitored continuously.Positive blood cultures  are called to the RN and reported as soon as  they are identified.      Narrative:       Per Hospital Policy: Only change Specimen Src: to \"Line\" if  specified by physician order.    URINALYSIS [[id number]]  (Abnormal) Collected:  12/13/18 0336    Order Status:  Completed Specimen:  Urine from Urine, Clean Catch Updated:  12/13/18 0348     Color Yellow     Character Clear     Specific Gravity 1.009     Ph 6.5     Glucose Negative mg/dL      Ketones 40 (A) mg/dL      Protein Negative mg/dL      Bilirubin Negative " "    Urobilinogen, Urine 0.2     Nitrite Negative     Leukocyte Esterase Negative     Occult Blood Negative     Micro Urine Req see below     Comment: Microscopic examination not performed when specimen is clear  and chemically negative for protein, blood, leukocyte esterase  and nitrite.         Narrative:       Collected By:[id number] OLIMPIA VARELA    BLOOD CULTURE [[id number]] Collected:  12/11/18 1510    Order Status:  Completed Specimen:  Blood from Peripheral Updated:  12/12/18 0828     Significant Indicator NEG     Source BLD     Site PERIPHERAL     Blood Culture No Growth    Note: Blood cultures are incubated for 5 days and  are monitored continuously.Positive blood cultures  are called to the RN and reported as soon as  they are identified.      Narrative:       Per Hospital Policy: Only change Specimen Src: to \"Line\" if  specified by physician order.    Urinalysis, culture if indicated [[id number]] Collected:  12/11/18 2345    Order Status:  Completed Specimen:  Urine Updated:  12/12/18 0023     Micro Urine Req see below     Comment: Microscopic examination not performed when specimen is clear  and chemically negative for protein, blood, leukocyte esterase  and nitrite.          Color Yellow     Character Clear     Specific Gravity >=1.030     Ph 5.5     Glucose Negative mg/dL      Ketones Negative mg/dL      Protein Negative mg/dL      Bilirubin Negative     Urobilinogen, Urine 0.2     Nitrite Negative     Leukocyte Esterase Negative     Occult Blood Negative    URINALYSIS [[id number]] Collected:  12/11/18 0000    Order Status:  Canceled Specimen:  Urine from Urine, Clean Catch           Assessment:  Active Hospital Problems    Diagnosis   • Encephalopathy acute [G93.40]   • Hypotension [I95.9]   • Acute kidney injury (HCC) [N17.9]   • Bacteremia [R78.81]   • Hypokalemia [E87.6]   • Pneumonia [J18.9]   • Constipation [K59.00]   • Abdominal pain [R10.9]   • Nicotine abuse [Z72.0]   • Leukocytosis [D72.829]   • " Methamphetamine abuse ([facility]) [F15.10]          ASSESSMENT:      48 y.o. male admitted 12/11/2018. Pt has a past medical history of methamphetamine abuse and urine drug screen this admission positive for amphetamines, opiates, and cannabinoids.. He presented to the ER complaining of low back pain.  He was afebrile but was found to be hypotensive which responded to IVF.  Leukocytosis of 13.2. He was also noted to have acute kidney injury.  Chest x-ray with no indication of pneumonia. CT and MRI of the lumbar spine were unrevealing.  CT renal evaluation with no acute concerning findings but did report patchy right lung lower airspace opacities. Blood cultures were obtained with growth of viridans Streptococcus 1/2 sets.  Repeat cultures so far no growth.  He was initially started on Unasyn, transitioned to ceftriaxone 2 g every 24 on 12/13 remains on to date.           Bacteremia, Virdens strep  -Unknown source but patient methamphetamine user likely poor oral care, denies IV drug use, overall poor historian  -MRI with no indication of discitis or abscess  -12/14 blood cultures no growth to date     ?Native aortic valve infective endocarditis-cannot rule out per TTE on 12/14  -TIMA scheduled for 12/17     Right lung opacities on CT A/P  -Concern for pneumonia, not read as septic emboli and possible vegetation is on aortic valve  -CT chest 12/14 Focal groundglass opacity in the RIGHT upper lobe, likely pneumonitis.  Follow-up recommended to ensure this is not a tumor.      Fevers, 2/2 above     Leukocytosis, neutrophilic 2/2 above  -Remains significantly elevated at 19     Methamphetamine and opiate abuse  -HIV screening as well as hepatitis panel all negative           PLAN:      Continue ceftriaxone 2 g q. 24   Awaiting TIMA   Follow-up repeat blood cultures 2 sets -no growth to date           Plan of care discussed with KEVEN Hernandez M.D.. Will continue to follow     Claudia Calix M.D.

## 2018-12-17 NOTE — PROGRESS NOTES
Chart reviewed for AMI and HF quality measures. Neither diagnosis applicable at this time, patient is being followed by cardiology for possible endocarditis.      Cardiovascular Nurse Navigator will not follow.    Radha CASTILLO RN, [facility] ext. [phone] M-F

## 2018-12-17 NOTE — PROGRESS NOTES
This RN was with patient, he was sitting at the edge of bed, getting ready to go to the bathroom when he appeared to have a sharp pain in the back of his neck; he scowled and reached up and grabbed the back of his neck and then fell back in the bed unresponsive with seizure like jerking and posturing with agonal respirations & cyanotic appearance. staff assist called and alerted MD, pt noted to have pulse, oxygen placed on patient and attempted to get a set of vitals when pt lost pulse, called code blue; compressions started immediately and code team arrived; see code documentation. Pt ultimately passed with time of death called by Dr. Hernandez at bedside. Family notified and present at bedside with social work.

## 2018-12-18 LAB
BACTERIA BLD CULT: NORMAL
SIGNIFICANT IND 70042: NORMAL
SITE SITE: NORMAL
SOURCE SOURCE: NORMAL

## 2018-12-19 LAB
BACTERIA BLD CULT: NORMAL
BACTERIA BLD CULT: NORMAL
SIGNIFICANT IND 70042: NORMAL
SIGNIFICANT IND 70042: NORMAL
SITE SITE: NORMAL
SITE SITE: NORMAL
SOURCE SOURCE: NORMAL
SOURCE SOURCE: NORMAL
